# Patient Record
Sex: FEMALE | Race: WHITE | NOT HISPANIC OR LATINO | Employment: FULL TIME | URBAN - METROPOLITAN AREA
[De-identification: names, ages, dates, MRNs, and addresses within clinical notes are randomized per-mention and may not be internally consistent; named-entity substitution may affect disease eponyms.]

---

## 2017-08-14 ENCOUNTER — OFFICE VISIT (OUTPATIENT)
Dept: URGENT CARE | Facility: URGENT CARE | Age: 37
End: 2017-08-14
Payer: COMMERCIAL

## 2017-08-14 VITALS
WEIGHT: 194.5 LBS | TEMPERATURE: 98.7 F | SYSTOLIC BLOOD PRESSURE: 106 MMHG | HEART RATE: 87 BPM | DIASTOLIC BLOOD PRESSURE: 68 MMHG | OXYGEN SATURATION: 96 %

## 2017-08-14 DIAGNOSIS — S86.812A STRAIN OF CALF MUSCLE, LEFT, INITIAL ENCOUNTER: Primary | ICD-10-CM

## 2017-08-14 PROCEDURE — 99202 OFFICE O/P NEW SF 15 MIN: CPT | Performed by: PHYSICIAN ASSISTANT

## 2017-08-14 NOTE — NURSING NOTE
Chief Complaint   Patient presents with     Musculoskeletal Problem     start: on and off for 1 month, right calf sx: pain, family hx of blood clots, no injury, no warmth, or swelling of the feets  tx: none         Initial /68 (BP Location: Right arm, Patient Position: Chair, Cuff Size: Adult Large)  Pulse 87  Temp 98.7  F (37.1  C) (Oral)  Wt 194 lb 8 oz (88.2 kg)  SpO2 96% There is no height or weight on file to calculate BMI.  Medication Reconciliation: complete   [place] Ontiveros MA

## 2017-08-14 NOTE — PROGRESS NOTES
Elvira Ugalde is a 37 year old year old female who presents today for evaluation of complaints of left calf discomfort that has been occurring on and off for the past month. States it occurred again when she went running today. Concerned because her aunt had a blood clot and she went on the internet and started to worry. States she is otherwise healthy.     Review Of Systems  Skin: negative for rash, redness, swelling  Eyes: negative  Ears/Nose/Throat: negative  Respiratory: No shortness of breath, dyspnea on exertion, cough, or hemoptysis  Cardiovascular: negative, no hx of vascular issues  Musculoskeletal: positive for occasional discomfort in left calf, increases with exercise. Pain mild - not intense. Right calf negative.  Neurologic: negative. No numbness, loss of sensation, weakness, change in gait    History reviewed. No pertinent past medical history.    History reviewed. No pertinent surgical history.    Family History   Problem Relation Age of Onset     Family History Negative Mother      Family History Negative Father      Family History Negative Maternal Grandmother      Family History Negative Maternal Grandfather      Family History Negative Paternal Grandmother      Family History Negative Paternal Grandfather      Family History Negative Brother      Family History Negative Sister        Social History   Substance Use Topics     Smoking status: Current Every Day Smoker     Types: Cigarettes     Smokeless tobacco: Never Used     Alcohol use Yes       Drug and lactation database from the [place] National Library of Medicine:  http://toxnet.nlm.nih.gov/cgi-bin/sis/htmlgen?LACT      Exam:  Constitutional: healthy, alert and no distress  Head: negative  Cardiovascular: negative  Respiratory: negative  Musculoskeletal: extremities normal- no gross deformities noted, normal muscle tone, no ankle edema, no calf edema, no erythema, pedal and DP  pulses normal bilaterally, normal range of motion  bilaterally, nml capillary refill, no tenderness to deep palpation of bilateral calves, negative Rueda sign, Muscle strength equal bilateral LE's  Skin: no suspicious lesions or rashes  Neurologic: negative, nml gross sensation bilateral LE's    A/P:    (E68.037D) Strain of calf muscle, left, initial encounter  (primary encounter diagnosis)  Comment: Pt informed that likelihood of blood clot is low. However, if pt is still concerned she can have a D-dimer and US done by primary MD or at an ER - neither tests are done at this UC.  Plan:Informed pt of warning signs for a blood clot. Pt will follow up with primary MD if she continues to have concerns.      Follow up with primary MD prn problems/worsening symptoms.

## 2018-10-17 ENCOUNTER — OFFICE VISIT (OUTPATIENT)
Dept: URGENT CARE | Facility: URGENT CARE | Age: 38
End: 2018-10-17
Payer: COMMERCIAL

## 2018-10-17 VITALS
TEMPERATURE: 97.5 F | HEART RATE: 71 BPM | DIASTOLIC BLOOD PRESSURE: 72 MMHG | OXYGEN SATURATION: 97 % | SYSTOLIC BLOOD PRESSURE: 108 MMHG

## 2018-10-17 DIAGNOSIS — S01.80XA OPEN WOUND OF FACE, INITIAL ENCOUNTER: Primary | ICD-10-CM

## 2018-10-17 DIAGNOSIS — Z23 NEED FOR TDAP VACCINATION: ICD-10-CM

## 2018-10-17 PROCEDURE — 90471 IMMUNIZATION ADMIN: CPT | Performed by: FAMILY MEDICINE

## 2018-10-17 PROCEDURE — 99213 OFFICE O/P EST LOW 20 MIN: CPT | Mod: 25 | Performed by: FAMILY MEDICINE

## 2018-10-17 PROCEDURE — 90715 TDAP VACCINE 7 YRS/> IM: CPT | Performed by: FAMILY MEDICINE

## 2018-10-17 NOTE — MR AVS SNAPSHOT
After Visit Summary   10/17/2018    Elvira Ugalde    MRN: [id number]           Patient Information     Date Of Birth          1980        Visit Information        Provider Department      10/17/2018 9:00 AM Kelvin Rodas MD [facility]        Today's Diagnoses     Open wound of face, initial encounter    -  1    Need for Tdap vaccination          Care Instructions    Monitor for wound infection.  Follow up with Dermatology or Plastic Surgery for scar assessment.      Old Laceration: Not Sutured  A laceration is a cut through the skin. This will usually require stitches if it is deep. However, if a laceration remains open for too long, the risk of infection increases. In your case, too much time has passed before coming for treatment. The danger of infection from stitching at this time is too high. That is why your wound was not stitched.  If the wound is spread open, it will heal by filling in from the bottom and sides. A wound that is not stitched may take 1 to 4 weeks to heal, depending on the size of the opening. You will probably have a visible scar. You can discuss revision of the scar with your healthcare provider at a later time.  Home care  The following guidelines will help you care for your laceration at home:    Keep the wound clean and dry. If a bandage was applied and it becomes wet or dirty, replace it. Otherwise, leave it in place for the first 24 hours, then change it once a day or as directed.    The healthcare provider may prescribe an antibiotic cream or ointment to prevent infection.     If you are at high risk for infection, or the wound is very dirty, your provider may prescribe an oral antibiotic medicine to prevent infection. Don't stop using this medicine until you have finished it all, or the provider tells you to stop.    The healthcare provider may also prescribe medicine for pain. Follow instructions for taking these medicines.  Clean the wound  daily:    After removing any bandage, wash the area with soap and water. Use a wet cotton swab to loosen and remove any blood or crust that forms.    Talk with your healthcare provider before applying any antibiotic ointment to the wound. Reapply a fresh bandage.    You may remove the bandage to shower as usual after the first 24 hours, but don't soak the area in water. This means no tub baths or swimming until the wound heals or your provider tells you it's OK.  Don't do activities that may reinjure your wound.    Check the wound daily for signs of infection listed below.  Follow-up care  Follow up with your healthcare provider, or as advised.  When to seek medical advice  Call your healthcare provider right away if any of these occur:    Wound bleeding not controlled by direct pressure    Signs of infection, including increasing pain in the wound, increasing wound redness or swelling, or pus or bad odor coming from the wound    Fever of 100.4 F (38. C) or higher, or as directed by your healthcare provider    Wound edges reopen    Wound changes colors    Numbness around the wound     Decreased movement around the injured area  Date Last Reviewed: 7/1/2017 2000-2017 [employer]. [address] [place] [address]. All rights reserved. This information is not intended as a substitute for professional medical care. Always follow your healthcare professional's instructions.                Follow-ups after your visit        Who to contact     If you have questions or need follow up information about today's clinic visit or your schedule please contact [facility] directly at [phone].  Normal or non-critical lab and imaging results will be communicated to you by MyChart, letter or phone within 4 business days after the clinic has received the results. If you do not hear from us within 7 days, please contact the clinic through MyChart or phone. If you have a critical or abnormal  lab result, we will notify you by phone as soon as possible.  Submit refill requests through [employer] or call your pharmacy and they will forward the refill request to us. Please allow 3 business days for your refill to be completed.          Additional Information About Your Visit        Care EveryWhere ID     This is your Care EveryWhere ID. This could be used by other organizations to access your [place] medical records  [id number]        Your Vitals Were     Pulse Temperature Pulse Oximetry             71 97.5  F (36.4  C) (Tympanic) 97%          Blood Pressure from Last 3 Encounters:   10/17/18 108/72   08/14/17 106/68    Weight from Last 3 Encounters:   08/14/17 194 lb 8 oz (88.2 kg)              We Performed the Following     TDAP, IM (10 - 64 YRS) - Adacel        Primary Care Provider    Provider Not In System                Equal Access to Services     [facility] CenterMARLYN : Hadii nieves cuevaso Soniraj, waaxda luqadaha, qaybta kaalmada adeegyada, brisa bolden . So [facility] [phone].    ATENCIÓN: Si habla español, tiene a pal disposición servicios gratuitos de asistencia lingüística. Ramírez al [phone].    We comply with applicable federal civil rights laws and [place] laws. We do not discriminate on the basis of race, color, national origin, age, disability, sex, sexual orientation, or gender identity.            Thank you!     Thank you for choosing [facility]  for your care. Our goal is always to provide you with excellent care. Hearing back from our patients is one way we can continue to improve our services. Please take a few minutes to complete the written survey that you may receive in the mail after your visit with us. Thank you!             Your Updated Medication List - Protect others around you: Learn how to safely use, store and throw away your medicines at www.disposemymeds.org.      Notice  As of 10/17/2018  9:26 AM    You have not been prescribed any  medications.

## 2018-10-17 NOTE — PROGRESS NOTES
SUBJECTIVE:     Chief Complaint   Patient presents with     Urgent Care     Laceration     laceration on top on lip from door x last night      Elvira Ugalde is a 38 year old female who presents to the clinic with a laceration on upper lip between philtrum sustained last night around 9pm (12 hours ago).  This is a non-work related and accidental injury.  Patient applied adhesive glue to area last night.      Mechanism of injury: car door.    Associated symptoms: Denies numbness, weakness, or loss of function  Last tetanus booster within 10 years: no    EXAM:   The patient appears today in alert,no apparent distress distress  VITALS: /72 (BP Location: Left arm, Patient Position: Chair, Cuff Size: Adult Regular)  Pulse 71  Temp 97.5  F (36.4  C) (Tympanic)  SpO2 97%    Size of laceration: 1 centimeters  Characteristics of the laceration: straight and superficial  Tendon function intact: not applicable  Sensation to light touch intact: yes  Pulses intact: not applicable  Picture included in patient's chart: no    Assessment:     Open wound of face, initial encounter  Need for Tdap vaccination    PLAN:  Wound cleaned with Shur-Clens, too old for wound repair    After care instructions:  Keep wound clean and dry  Signs of infection discussed today  Discussed the probability of scarring  Follow up with Derm or Plastic Surgery for scar assessment after wound heals    Tdap given today.    Kelvin Rodas MD  October 17, 2018 9:39 AM

## 2018-10-17 NOTE — PATIENT INSTRUCTIONS
Monitor for wound infection.  Follow up with Dermatology or Plastic Surgery for scar assessment.      Old Laceration: Not Sutured  A laceration is a cut through the skin. This will usually require stitches if it is deep. However, if a laceration remains open for too long, the risk of infection increases. In your case, too much time has passed before coming for treatment. The danger of infection from stitching at this time is too high. That is why your wound was not stitched.  If the wound is spread open, it will heal by filling in from the bottom and sides. A wound that is not stitched may take 1 to 4 weeks to heal, depending on the size of the opening. You will probably have a visible scar. You can discuss revision of the scar with your healthcare provider at a later time.  Home care  The following guidelines will help you care for your laceration at home:    Keep the wound clean and dry. If a bandage was applied and it becomes wet or dirty, replace it. Otherwise, leave it in place for the first 24 hours, then change it once a day or as directed.    The healthcare provider may prescribe an antibiotic cream or ointment to prevent infection.     If you are at high risk for infection, or the wound is very dirty, your provider may prescribe an oral antibiotic medicine to prevent infection. Don't stop using this medicine until you have finished it all, or the provider tells you to stop.    The healthcare provider may also prescribe medicine for pain. Follow instructions for taking these medicines.  Clean the wound daily:    After removing any bandage, wash the area with soap and water. Use a wet cotton swab to loosen and remove any blood or crust that forms.    Talk with your healthcare provider before applying any antibiotic ointment to the wound. Reapply a fresh bandage.    You may remove the bandage to shower as usual after the first 24 hours, but don't soak the area in water. This means no tub baths or swimming  until the wound heals or your provider tells you it's OK.  Don't do activities that may reinjure your wound.    Check the wound daily for signs of infection listed below.  Follow-up care  Follow up with your healthcare provider, or as advised.  When to seek medical advice  Call your healthcare provider right away if any of these occur:    Wound bleeding not controlled by direct pressure    Signs of infection, including increasing pain in the wound, increasing wound redness or swelling, or pus or bad odor coming from the wound    Fever of 100.4 F (38. C) or higher, or as directed by your healthcare provider    Wound edges reopen    Wound changes colors    Numbness around the wound     Decreased movement around the injured area  Date Last Reviewed: 7/1/2017 2000-2017 [employer]. [address] [place] [address], [place] [address]. All rights reserved. This information is not intended as a substitute for professional medical care. Always follow your healthcare professional's instructions.

## 2020-11-03 ENCOUNTER — APPOINTMENT (OUTPATIENT)
Dept: GENERAL RADIOLOGY | Facility: CLINIC | Age: 40
End: 2020-11-03
Attending: PHYSICIAN ASSISTANT
Payer: COMMERCIAL

## 2020-11-03 ENCOUNTER — HOSPITAL ENCOUNTER (EMERGENCY)
Facility: CLINIC | Age: 40
Discharge: HOME | End: 2020-11-03
Attending: EMERGENCY MEDICINE | Admitting: EMERGENCY MEDICINE
Payer: COMMERCIAL

## 2020-11-03 VITALS
RESPIRATION RATE: 20 BRPM | HEART RATE: 66 BPM | BODY MASS INDEX: 32.74 KG/M2 | SYSTOLIC BLOOD PRESSURE: 114 MMHG | HEIGHT: 66 IN | WEIGHT: 203.71 LBS | OXYGEN SATURATION: 97 % | DIASTOLIC BLOOD PRESSURE: 81 MMHG | TEMPERATURE: 96.8 F

## 2020-11-03 DIAGNOSIS — R07.9 CHEST PAIN, UNSPECIFIED: ICD-10-CM

## 2020-11-03 LAB
ANION GAP SERPL CALCULATED.3IONS-SCNC: 7 MMOL/L (ref 3–14)
BASOPHILS # BLD AUTO: 0.1 10E9/L (ref 0–0.2)
BASOPHILS NFR BLD AUTO: 0.7 %
BUN SERPL-MCNC: 10 MG/DL (ref 7–30)
CALCIUM SERPL-MCNC: 8.7 MG/DL (ref 8.5–10.1)
CHLORIDE SERPL-SCNC: 109 MMOL/L (ref 94–109)
CO2 SERPL-SCNC: 23 MMOL/L (ref 20–32)
CREAT SERPL-MCNC: 0.74 MG/DL (ref 0.52–1.04)
DIFFERENTIAL METHOD BLD: NORMAL
EOSINOPHIL # BLD AUTO: 0.1 10E9/L (ref 0–0.7)
EOSINOPHIL NFR BLD AUTO: 0.7 %
ERYTHROCYTE [DISTWIDTH] IN BLOOD BY AUTOMATED COUNT: 12.8 % (ref 10–15)
GFR SERPL CREATININE-BSD FRML MDRD: >90 ML/MIN/{1.73_M2}
GLUCOSE SERPL-MCNC: 90 MG/DL (ref 70–99)
HCT VFR BLD AUTO: 44.1 % (ref 35–47)
HGB BLD-MCNC: 14.6 G/DL (ref 11.7–15.7)
IMM GRANULOCYTES # BLD: 0 10E9/L (ref 0–0.4)
IMM GRANULOCYTES NFR BLD: 0.3 %
INTERPRETATION ECG - MUSE: NORMAL
LYMPHOCYTES # BLD AUTO: 1.4 10E9/L (ref 0.8–5.3)
LYMPHOCYTES NFR BLD AUTO: 15.8 %
MCH RBC QN AUTO: 31.4 PG (ref 26.5–33)
MCHC RBC AUTO-ENTMCNC: 33.1 G/DL (ref 31.5–36.5)
MCV RBC AUTO: 95 FL (ref 78–100)
MONOCYTES # BLD AUTO: 0.6 10E9/L (ref 0–1.3)
MONOCYTES NFR BLD AUTO: 6.4 %
NEUTROPHILS # BLD AUTO: 6.8 10E9/L (ref 1.6–8.3)
NEUTROPHILS NFR BLD AUTO: 76.1 %
NRBC # BLD AUTO: 0 10*3/UL
NRBC BLD AUTO-RTO: 0 /100
PLATELET # BLD AUTO: 323 10E9/L (ref 150–450)
POTASSIUM SERPL-SCNC: 4 MMOL/L (ref 3.4–5.3)
RBC # BLD AUTO: 4.65 10E12/L (ref 3.8–5.2)
SODIUM SERPL-SCNC: 139 MMOL/L (ref 133–144)
TROPONIN I SERPL-MCNC: <0.015 UG/L (ref 0–0.04)
TROPONIN I SERPL-MCNC: <0.015 UG/L (ref 0–0.04)
WBC # BLD AUTO: 8.9 10E9/L (ref 4–11)

## 2020-11-03 PROCEDURE — 80048 BASIC METABOLIC PNL TOTAL CA: CPT | Performed by: PHYSICIAN ASSISTANT

## 2020-11-03 PROCEDURE — 85025 COMPLETE CBC W/AUTO DIFF WBC: CPT | Performed by: PHYSICIAN ASSISTANT

## 2020-11-03 PROCEDURE — 84484 ASSAY OF TROPONIN QUANT: CPT | Mod: 91 | Performed by: PHYSICIAN ASSISTANT

## 2020-11-03 PROCEDURE — 71046 X-RAY EXAM CHEST 2 VIEWS: CPT

## 2020-11-03 PROCEDURE — 99285 EMERGENCY DEPT VISIT HI MDM: CPT | Mod: 25

## 2020-11-03 PROCEDURE — 93005 ELECTROCARDIOGRAM TRACING: CPT

## 2020-11-03 ASSESSMENT — MIFFLIN-ST. JEOR
SCORE: 1593.94
SCORE: 1610.75

## 2020-11-03 ASSESSMENT — ENCOUNTER SYMPTOMS
COUGH: 0
FEVER: 0
SHORTNESS OF BREATH: 0
NAUSEA: 1
PALPITATIONS: 0
VOMITING: 0
CHILLS: 0
ABDOMINAL PAIN: 0

## 2020-11-03 NOTE — ED AVS SNAPSHOT
[facility]  [address]  [facility] [address]  Phone: [phone]  Fax: [phone]                                    Elvira Ugalde   MRN: [id number]    Department: [facility]   Date of Visit: 11/3/2020           After Visit Summary Signature Page    I have received my discharge instructions, and my questions have been answered. I have discussed any challenges I see with this plan with the nurse or doctor.    ..........................................................................................................................................  Patient/Patient Representative Signature      ..........................................................................................................................................  Patient Representative Print Name and Relationship to Patient    ..................................................               ................................................  Date                                   Time    ..........................................................................................................................................  Reviewed by Signature/Title    ...................................................              ..............................................  Date                                               Time          22EPIC Rev 08/18

## 2020-11-03 NOTE — DISCHARGE INSTRUCTIONS
A video appointment was scheduled for you with :   Nov 09, 2020  8:00 AM   (Arrive by 7:45 AM)   Video Visit with ELDA Benitez [facility] ([facility] [address] [place] [address]   [address]   [place] [address] [phone]       Please have your hospital discharge paperwork, ID and insurance information available to review if needed. You will need to have a [employer] account for the video visit.  You can go to : [email or web address] or call [phone], for help in setting up the video visit.       Discharge Instructions  Chest Pain    You have been seen today for chest pain or discomfort.  At this time, your provider has found no signs that your chest pain is due to a serious or life-threatening condition, (or you have declined more testing and/or admission to the hospital). However, sometimes there is a serious problem that does not show up right away. Your evaluation today may not be complete and you may need further testing and evaluation.     Generally, every Emergency Department visit should have a follow-up clinic visit with either a primary or a specialty clinic/provider. Please follow-up as instructed by your emergency provider today.  Return to the Emergency Department if:    Your chest pain changes, gets worse, starts to happen more often, or comes with less activity.    You are newly short of breath.    You get very weak or tired.    You pass out or faint.    You have any new symptoms, like fever, cough, numb legs, or you cough up blood.    You have anything else that worries you.    Until you follow-up with your regular provider, please do the following:    Take one aspirin daily unless you have an allergy or are told not to by your provider.    If a stress test appointment has been made, go to the appointment.    If you have questions, contact your regular provider.    Follow-up with your regular provider/clinic as directed; this is  very important.    If you were given a prescription for medicine here today, be sure to read all of the information (including the package insert) that comes with your prescription.  This will include important information about the medicine, its side effects, and any warnings that you need to know about.  The pharmacist who fills the prescription can provide more information and answer questions you may have about the medicine.  If you have questions or concerns that the pharmacist cannot address, please call or return to the Emergency Department.       Remember that you can always come back to the Emergency Department if you are not able to see your regular provider in the amount of time listed above, if you get any new symptoms, or if there is anything that worries you.

## 2020-11-03 NOTE — ED NOTES
Care Management Initial Consult    General Information  Assessment completed with: Patient, Elvira     Primary Care Provider verified and updated as needed:   yes, no PCP on record.  Readmission within the last 30 days: no previous admission in last 30 days         Advance Care Planning: Advance Care Planning Reviewed: no concerns identified          Communication Assessment  Patient's communication style: spoken language (English or Bilingual)             Cognitive  Cognitive/Neuro/Behavioral: WDL                      Living Environment:   People in home:     unknown  Current living Arrangements:      unknown  Able to return to prior arrangements:  yes       Family/Social Support:  Care provided by:   unknown  Provides care for:   unknown                Description of Support System:    unknown       Current Resources:   Skilled Home Care Services:  None  Community Resources: None  Equipment currently used at home: none  Supplies currently used at home: None    Employment/Financial:  Employment Status: employed full-time    Pt is a  with [employer]     Financial Concerns: No concerns identified           Lifestyle & Psychosocial Needs:        Socioeconomic History     Marital status: Legally      Spouse name: Not on file     Number of children: Not on file     Years of education: Not on file     Highest education level: Not on file     Tobacco Use     Smoking status: Current Every Day Smoker     Types: Cigarettes     Smokeless tobacco: Never Used   Substance and Sexual Activity     Alcohol use: Yes     Comment: a few drinks per week     Drug use: No     Sexual activity: Yes     Partners: Male     Birth control/protection: I.U.D.       Functional Status:  Prior to admission patient needed assistance: No assistance needed             Mental Health Status:  Mental Health Status: Other (see comment)  Mental Health Management: Individual Therapy.  Pt reports that she is in therapy,  and that it is working, but that she currently has a lot of stress in her life.    Chemical Dependency Status:  Chemical Dependency Status: No Current Concerns             Values/Beliefs:  Spiritual, Cultural Beliefs, Congregation Practices, Values that affect care: no               Care Management Discharge Note    Discharge Date: 11/03/20       Discharge Disposition: Home    Discharge Services: None    Discharge DME:  None    Discharge Transportation: family or friend will provide    Private pay costs discussed: Not applicable    PAS Confirmation Code:  NA  Patient/family educated on Medicare website which has current facility and service quality ratings: no    Education Provided on the Discharge Plan:  yes.,  A PCP appt was made with the [facility] for 11/9/2020, the appointment information was put on the discharge instructions on the AVS.  A care coordination referral was made to the same clinic.  Persons Notified of Discharge Plans: no  Patient/Family in Agreement with the Plan: yes    Handoff Referral Completed: Yes      Bere Campbell RN Care Coordinator,  BSN, PHN, CCM, PETROS  Inpatient Care Coordination - Emergency Department  [facility]   [phone]

## 2020-11-03 NOTE — ED PROVIDER NOTES
"  History   Chief Complaint:  Chest Pain    HPI  Elvira Ugalde is a 40 year old female with no reported past medical history who presents for evaluation of chest pain. She reports the sudden onset of left-sided, non-radiating chest pain and tightness around 0830 this morning while sitting at her desk at work. She notes pain across her jaw but does note that this is a chronic issues secondary to grinding her teeth. She also notes intermittent nausea however she associates this with increased stress and anxiety. Here she denies pain at rest but reports that she has chest discomfort at the \"peak of her breath\". She did take 3 baby aspirin at home piror to presenting. The patient denies shortness of breath, cough, hemoptysis, palpitations, fever, or chills. She also denies abdominal pain. Notes a family history of blood clots, but denies a personal history of clots or family cardiac history. She denies recent travel or prolonged car rides. The patient denied recent surgery and is not on estrogen therapy.     Allergies:  No Known Allergies    Medications:    The patient is currently on no regular medications.    Past Medical History:    The patient denies any significant past medical history.    Past Surgical History:    The patient does not have any pertinent past surgical history.     Family History:    No past pertinent family history.     Social History:  Marital Status:   Presents with  at bedside  Tobacco use: Current some day smoker  Alcohol use: Yes  Drug use: No    Review of Systems   Constitutional: Negative for chills and fever.   Respiratory: Negative for cough and shortness of breath.    Cardiovascular: Positive for chest pain. Negative for palpitations.   Gastrointestinal: Positive for nausea. Negative for abdominal pain and vomiting.   All other systems reviewed and are negative.    Physical Exam     Patient Vitals for the past 24 hrs:   BP Temp Temp src Pulse Resp SpO2 Height Weight " "  11/03/20 1310 -- -- -- 66 20 97 % -- --   11/03/20 1300 114/81 -- -- 67 12 98 % -- --   11/03/20 1250 111/79 -- -- 73 13 96 % -- --   11/03/20 1230 118/65 -- -- 67 10 98 % -- --   11/03/20 1120 -- -- -- 64 11 96 % -- --   11/03/20 1115 105/73 -- -- 64 10 96 % -- --   11/03/20 1100 126/87 -- -- 70 12 99 % -- --   11/03/20 1048 -- -- -- -- -- -- -- 92.4 kg (203 lb 11.3 oz)   11/03/20 0924 126/82 96.8  F (36  C) Oral 74 20 97 % 1.676 m (5' 6\") 90.7 kg (200 lb)       Physical Exam  Vitals signs and nursing note reviewed.   Eyes:      General: No scleral icterus.     Extraocular Movements: Extraocular movements intact.      Conjunctiva/sclera: Conjunctivae normal.   Cardiovascular:      Rate and Rhythm: Regular rhythm. Normal Rate.     Pulses: Normal pulses.      Heart sounds: Normal heart sounds.   Pulmonary:      Effort: Pulmonary effort is normal.      Breath sounds: Normal breath sounds.   Abdominal:      General: Abdomen is flat. Bowel sounds are normal.      Palpations: Abdomen is soft.      Tenderness: There is no abdominal tenderness.   Musculoskeletal: Normal range of motion.      Right lower leg: No edema.      Left lower leg: No edema.   Skin:     General: Skin is warm and dry.   Neurological:      Mental Status: Responds appropriately to questions.   Psychiatric:         Mood and Affect: Mood normal.         Behavior: Behavior normal.     Emergency Department Course     ECG:  Indication: Chest pain  Time: 0927  Vent. Rate 73 bpm. FL interval 138. QRS duration 76. QT/QTc 400/440. P-R-T axis 51 -3 15.   NSR, Normal ECG   Read time: 0933    Imaging:  Radiology findings were communicated with the patient who voiced understanding of the findings.    XR Chest 2 Views:  No acute findings. The lungs are clear and there are no   pleural effusions. Normal heart size, as per radiology.     Laboratory:  Laboratory findings were communicated with the patient who voiced understanding of the findings.    CBC: WBC: 8.9, " HGB: 14.6, PLT: 323    BMP: WNL (Creatinine: 0.74)    Troponin (Collected 1106): <0.015     Repeat Troponin (Collected 1230): <0.015      Emergency Department Course:  Past medical records, nursing notes, and vitals reviewed.    10:52: I performed an exam of the patient as documented above.     EKG obtained in the ED, see results above.   IV was inserted and blood was drawn for laboratory testing, results above.  The patient was sent for a XR while in the emergency department, results above.     1156: I rechecked the patient and discussed the results of her workup thus far. No new symptoms or exacerbation of chest tightness.     Findings and plan explained to the Patient. Patient discharged home with instructions regarding supportive care, medications, and reasons to return. The importance of close follow-up was reviewed.     I personally reviewed the laboratory and imaging results with the Patient and answered all related questions prior to discharge.     Impression & Plan     Medical Decision Making:  Elvira Ugalde is a 40 year old female with no reported past medical history who presents for evaluation of non-exertional, non-radiating left side chest pain beginning approximately 3 hours prior to presenting. Vitals were reviewed. Clinical history and physical exam not felt consistent with PE in the absence of hemoptysis, dyspnea, hypoxia, nor tachycardia. The patient does smoke however she is PERC negative. Aortic dissection unlikely based on history and clinical exam as peripheral pulses were felt, no cardiac murmur detected and patient remained hemodynamically stable. CXR was reviewed and shows no evidence of pneumothorax, infiltrate to suggest pneumonia, widened mediastinum to suggest aortic dissection, obvious rib fracture or free air under the diaphragm to suggest perforated viscous ulcer. Labs obtained with no electrolyte abnormalities. EKG shows NSR and troponins were negative. HEART score risk of MACE  of 0.9-1.7%. Patient was monitored on telemetry throughout her ED stay. On recheck the patient denied any exacerbation in her discomfort or new symptoms therefore I have low clinical suspicion that the patients symptoms are secondary to an acute cardiac or pulmonary process. The above results and clinical impression were discussed with the patient and her . Both verbalized understanding. I encouraged smoking cessation. The patient was advised to follow up with her primary care provider in 1-2 days for reassessment and return tot he ED if she has increased discomfort, SOB, or any other medical concerns. All questions and concerns were addressed prior to discharge.     Diagnosis:    ICD-10-CM    1. Chest pain, unspecified  R07.9 Troponin I (now)     CARE COORDINATION       Disposition:  Discharged to home.    Discharge Medications:  New Prescriptions    No medications on file       Scribe Disclosure:  Padmaja RIDER, am serving as a scribe at 10:52 AM on 11/3/2020 to document services personally performed by Usman Otero MD based on my observations and the provider's statements to me.      Rajani Kaur PA-C  11/03/20 [id number]

## 2020-11-09 ENCOUNTER — VIRTUAL VISIT (OUTPATIENT)
Dept: PEDIATRICS | Facility: CLINIC | Age: 40
End: 2020-11-09
Payer: COMMERCIAL

## 2020-11-09 DIAGNOSIS — F41.9 ANXIETY: Primary | ICD-10-CM

## 2020-11-09 DIAGNOSIS — R07.9 CHEST PAIN, UNSPECIFIED TYPE: ICD-10-CM

## 2020-11-09 PROCEDURE — 96127 BRIEF EMOTIONAL/BEHAV ASSMT: CPT | Performed by: NURSE PRACTITIONER

## 2020-11-09 PROCEDURE — 99214 OFFICE O/P EST MOD 30 MIN: CPT | Mod: 95 | Performed by: NURSE PRACTITIONER

## 2020-11-09 RX ORDER — ESCITALOPRAM OXALATE 10 MG/1
10 TABLET ORAL DAILY
Qty: 90 TABLET | Refills: 3 | Status: SHIPPED | OUTPATIENT
Start: 2020-11-09 | End: 2021-02-01

## 2020-11-09 ASSESSMENT — PATIENT HEALTH QUESTIONNAIRE - PHQ9
10. IF YOU CHECKED OFF ANY PROBLEMS, HOW DIFFICULT HAVE THESE PROBLEMS MADE IT FOR YOU TO DO YOUR WORK, TAKE CARE OF THINGS AT HOME, OR GET ALONG WITH OTHER PEOPLE: SOMEWHAT DIFFICULT
SUM OF ALL RESPONSES TO PHQ QUESTIONS 1-9: 10
SUM OF ALL RESPONSES TO PHQ QUESTIONS 1-9: 10

## 2020-11-09 NOTE — PROGRESS NOTES
"  Elvira Ugalde is a 40 year old female who is being evaluated via a billable video visit.      The patient has been notified of following:     \"This video visit will be conducted via a call between you and your physician/provider. We have found that certain health care needs can be provided without the need for an in-person physical exam.  This service lets us provide the care you need with a video conversation.  If a prescription is necessary we can send it directly to your pharmacy.  If lab work is needed we can place an order for that and you can then stop by our lab to have the test done at a later time.    Video visits are billed at different rates depending on your insurance coverage.  Please reach out to your insurance provider with any questions.    If during the course of the call the physician/provider feels a video visit is not appropriate, you will not be charged for this service.\"    Patient has given verbal consent for Video visit? Yes  How would you like to obtain your AVS? MyChart  If you are dropped from the video visit, the video invite should be resent to: Text to cell phone: [phone]  Will anyone else be joining your video visit? No    Subjective     Elvira Ugalde is a 40 year old female who presents today via video visit for the following health issues:    History of Present Illness       She eats 4 or more servings of fruits and vegetables daily.She consumes 0 sweetened beverage(s) daily.She exercises with enough effort to increase her heart rate 9 or less minutes per day.  She exercises with enough effort to increase her heart rate 3 or less days per week.   She is taking medications regularly.       ED/UC Followup:    Facility:  [facility]   Date of visit: 11/03/2020  Reason for visit: chest pain   Current Status: improved            Video Start Time: 8:03 AM    New Patient/Transfer of Care    Review of Systems   Constitutional, HEENT, cardiovascular, pulmonary, GI, , " "musculoskeletal, neuro, skin, endocrine and psych systems are negative, except as otherwise noted.      Objective           Vitals:  No vitals were obtained today due to virtual visit.    Physical Exam     GENERAL: Healthy, alert and no distress  EYES: Eyes grossly normal to inspection.  No discharge or erythema, or obvious scleral/conjunctival abnormalities.  RESP: No audible wheeze, cough, or visible cyanosis.  No visible retractions or increased work of breathing.    SKIN: Visible skin clear. No significant rash, abnormal pigmentation or lesions.  NEURO: Cranial nerves grossly intact.  Mentation and speech appropriate for age.  PSYCH: Mentation appears normal, affect normal/bright, judgement and insight intact, normal speech and appearance well-groomed.      No results found for any visits on 11/09/20.        Assessment & Plan     Anxiety  Getting . Daughter with severe mental illness. Twins not doing well in school. Daughter got covid. Feels the chest pain was a panic attack.   - escitalopram (LEXAPRO) 10 MG tablet; Take 1 tablet (10 mg) by mouth daily. Reviewed r/b/se  -Continue therapy  -Decrease caffeine  -Increase exerise  -Send message with update in 6 weeks    Chest pain, unspecified type  See ED visit. Very low risk for cardiopulmonary etiology. Pt thinks this was a panic attack. States chest pain is 95% gone. No shortness of breath, jaw pain, diaphoresis, etc  -Discussed reasons to seek care urgently.        Tobacco Cessation:   reports that she has been smoking cigarettes. She has never used smokeless tobacco.  Tobacco Cessation Action Plan: Information offered: Patient not interested at this time      BMI:   Estimated body mass index is 32.88 kg/m  as calculated from the following:    Height as of 11/3/20: 1.676 m (5' 6\").    Weight as of 11/3/20: 92.4 kg (203 lb 11.3 oz).   Weight management plan: Discussed healthy diet and exercise guidelines      Rosie Sen, ELDA [facility] " [facility]      Video-Visit Details    Type of service:  Video Visit    Video End Time:8:17 AM    Originating Location (pt. Location): Home    Distant Location (provider location):  [facility]     Platform used for Video Visit: [employer]                Answers for HPI/ROS submitted by the patient on 11/9/2020   Chronic problems general questions HPI Form  If you checked off any problems, how difficult have these problems made it for you to do your work, take care of things at home, or get along with other people?: Somewhat difficult  PHQ9 TOTAL SCORE: 10

## 2020-11-10 ASSESSMENT — PATIENT HEALTH QUESTIONNAIRE - PHQ9: SUM OF ALL RESPONSES TO PHQ QUESTIONS 1-9: 10

## 2020-12-27 ENCOUNTER — HEALTH MAINTENANCE LETTER (OUTPATIENT)
Age: 40
End: 2020-12-27

## 2021-02-01 ENCOUNTER — OFFICE VISIT (OUTPATIENT)
Dept: PEDIATRICS | Facility: CLINIC | Age: 41
End: 2021-02-01
Payer: COMMERCIAL

## 2021-02-01 VITALS
HEART RATE: 75 BPM | OXYGEN SATURATION: 97 % | TEMPERATURE: 97.9 F | RESPIRATION RATE: 13 BRPM | BODY MASS INDEX: 30.36 KG/M2 | HEIGHT: 66 IN | DIASTOLIC BLOOD PRESSURE: 64 MMHG | WEIGHT: 188.9 LBS | SYSTOLIC BLOOD PRESSURE: 118 MMHG

## 2021-02-01 DIAGNOSIS — Z12.31 ENCOUNTER FOR SCREENING MAMMOGRAM FOR BREAST CANCER: ICD-10-CM

## 2021-02-01 DIAGNOSIS — F41.9 ANXIETY: ICD-10-CM

## 2021-02-01 DIAGNOSIS — F17.210 CIGARETTE NICOTINE DEPENDENCE WITHOUT COMPLICATION: ICD-10-CM

## 2021-02-01 DIAGNOSIS — Z00.00 ROUTINE GENERAL MEDICAL EXAMINATION AT A HEALTH CARE FACILITY: Primary | ICD-10-CM

## 2021-02-01 DIAGNOSIS — Z30.430 ENCOUNTER FOR IUD INSERTION: ICD-10-CM

## 2021-02-01 DIAGNOSIS — Z12.4 ENCOUNTER FOR SCREENING FOR CERVICAL CANCER: ICD-10-CM

## 2021-02-01 DIAGNOSIS — Z11.3 ROUTINE SCREENING FOR STI (SEXUALLY TRANSMITTED INFECTION): ICD-10-CM

## 2021-02-01 LAB
CHOLEST SERPL-MCNC: 189 MG/DL
HCV AB SERPL QL IA: NONREACTIVE
HDLC SERPL-MCNC: 70 MG/DL
HIV 1+2 AB+HIV1 P24 AG SERPL QL IA: NONREACTIVE
LDLC SERPL CALC-MCNC: 103 MG/DL
NONHDLC SERPL-MCNC: 119 MG/DL
T PALLIDUM AB SER QL: NONREACTIVE
TRIGL SERPL-MCNC: 79 MG/DL

## 2021-02-01 PROCEDURE — 96127 BRIEF EMOTIONAL/BEHAV ASSMT: CPT | Performed by: NURSE PRACTITIONER

## 2021-02-01 PROCEDURE — 90686 IIV4 VACC NO PRSV 0.5 ML IM: CPT | Performed by: NURSE PRACTITIONER

## 2021-02-01 PROCEDURE — 99000 SPECIMEN HANDLING OFFICE-LAB: CPT | Performed by: NURSE PRACTITIONER

## 2021-02-01 PROCEDURE — 90732 PPSV23 VACC 2 YRS+ SUBQ/IM: CPT | Performed by: NURSE PRACTITIONER

## 2021-02-01 PROCEDURE — 87491 CHLMYD TRACH DNA AMP PROBE: CPT | Performed by: NURSE PRACTITIONER

## 2021-02-01 PROCEDURE — 90472 IMMUNIZATION ADMIN EACH ADD: CPT | Performed by: NURSE PRACTITIONER

## 2021-02-01 PROCEDURE — 87389 HIV-1 AG W/HIV-1&-2 AB AG IA: CPT | Performed by: NURSE PRACTITIONER

## 2021-02-01 PROCEDURE — 86780 TREPONEMA PALLIDUM: CPT | Mod: 90 | Performed by: NURSE PRACTITIONER

## 2021-02-01 PROCEDURE — 80061 LIPID PANEL: CPT | Performed by: NURSE PRACTITIONER

## 2021-02-01 PROCEDURE — 99214 OFFICE O/P EST MOD 30 MIN: CPT | Mod: 25 | Performed by: NURSE PRACTITIONER

## 2021-02-01 PROCEDURE — 90471 IMMUNIZATION ADMIN: CPT | Performed by: NURSE PRACTITIONER

## 2021-02-01 PROCEDURE — 86803 HEPATITIS C AB TEST: CPT | Performed by: NURSE PRACTITIONER

## 2021-02-01 PROCEDURE — 99396 PREV VISIT EST AGE 40-64: CPT | Mod: 25 | Performed by: NURSE PRACTITIONER

## 2021-02-01 PROCEDURE — 36415 COLL VENOUS BLD VENIPUNCTURE: CPT | Performed by: NURSE PRACTITIONER

## 2021-02-01 PROCEDURE — 87591 N.GONORRHOEAE DNA AMP PROB: CPT | Performed by: NURSE PRACTITIONER

## 2021-02-01 PROCEDURE — G0145 SCR C/V CYTO,THINLAYER,RESCR: HCPCS | Performed by: NURSE PRACTITIONER

## 2021-02-01 PROCEDURE — 87624 HPV HI-RISK TYP POOLED RSLT: CPT | Performed by: NURSE PRACTITIONER

## 2021-02-01 RX ORDER — ESCITALOPRAM OXALATE 20 MG/1
20 TABLET ORAL DAILY
Qty: 90 TABLET | Refills: 1 | Status: SHIPPED | OUTPATIENT
Start: 2021-02-01

## 2021-02-01 SDOH — HEALTH STABILITY: MENTAL HEALTH: HOW OFTEN DO YOU HAVE SIX OR MORE DRINKS ON ONE OCCASION?: NOT ASKED

## 2021-02-01 SDOH — HEALTH STABILITY: MENTAL HEALTH: HOW OFTEN DO YOU HAVE A DRINK CONTAINING ALCOHOL?: NOT ASKED

## 2021-02-01 SDOH — HEALTH STABILITY: MENTAL HEALTH: HOW MANY STANDARD DRINKS CONTAINING ALCOHOL DO YOU HAVE ON A TYPICAL DAY?: NOT ASKED

## 2021-02-01 SDOH — HEALTH STABILITY: MENTAL HEALTH: HOW MANY DRINKS CONTAINING ALCOHOL DO YOU HAVE ON A TYPICAL DAY WHEN YOU ARE DRINKING?: NOT ASKED

## 2021-02-01 SDOH — HEALTH STABILITY: MENTAL HEALTH: HOW OFTEN DO YOU HAVE 6 OR MORE DRINKS ON ONE OCCASION?: NOT ASKED

## 2021-02-01 ASSESSMENT — ENCOUNTER SYMPTOMS
DIARRHEA: 0
CHILLS: 0
FREQUENCY: 0
HEARTBURN: 0
HEMATURIA: 0
COUGH: 0
NAUSEA: 1
EYE PAIN: 0
MYALGIAS: 0
NERVOUS/ANXIOUS: 1
SHORTNESS OF BREATH: 0
DIZZINESS: 0
FEVER: 0
CONSTIPATION: 0
PARESTHESIAS: 0
PALPITATIONS: 0
HEADACHES: 1
SORE THROAT: 0
HEMATOCHEZIA: 0
ARTHRALGIAS: 0
WEAKNESS: 0
BREAST MASS: 1
JOINT SWELLING: 0
ABDOMINAL PAIN: 0
DYSURIA: 0

## 2021-02-01 ASSESSMENT — PATIENT HEALTH QUESTIONNAIRE - PHQ9
10. IF YOU CHECKED OFF ANY PROBLEMS, HOW DIFFICULT HAVE THESE PROBLEMS MADE IT FOR YOU TO DO YOUR WORK, TAKE CARE OF THINGS AT HOME, OR GET ALONG WITH OTHER PEOPLE: SOMEWHAT DIFFICULT
SUM OF ALL RESPONSES TO PHQ QUESTIONS 1-9: 8
SUM OF ALL RESPONSES TO PHQ QUESTIONS 1-9: 8

## 2021-02-01 ASSESSMENT — ANXIETY QUESTIONNAIRES
2. NOT BEING ABLE TO STOP OR CONTROL WORRYING: SEVERAL DAYS
GAD7 TOTAL SCORE: 4
5. BEING SO RESTLESS THAT IT IS HARD TO SIT STILL: NOT AT ALL
7. FEELING AFRAID AS IF SOMETHING AWFUL MIGHT HAPPEN: NOT AT ALL
4. TROUBLE RELAXING: SEVERAL DAYS
GAD7 TOTAL SCORE: 4
1. FEELING NERVOUS, ANXIOUS, OR ON EDGE: SEVERAL DAYS
7. FEELING AFRAID AS IF SOMETHING AWFUL MIGHT HAPPEN: NOT AT ALL
3. WORRYING TOO MUCH ABOUT DIFFERENT THINGS: SEVERAL DAYS
6. BECOMING EASILY ANNOYED OR IRRITABLE: NOT AT ALL
GAD7 TOTAL SCORE: 4

## 2021-02-01 ASSESSMENT — MIFFLIN-ST. JEOR: SCORE: 1538.59

## 2021-02-01 NOTE — PROGRESS NOTES
SUBJECTIVE:   CC: Elvira Ugalde is an 41 year old woman who presents for preventive health visit.     Patient has been advised of split billing requirements and indicates understanding: Yes     Healthy Habits:     Getting at least 3 servings of Calcium per day:  Yes    Bi-annual eye exam:  Yes    Dental care twice a year:  Yes    Sleep apnea or symptoms of sleep apnea:  Excessive snoring    Diet:  Regular (no restrictions)    Frequency of exercise:  6-7 days/week    Duration of exercise:  30-45 minutes    Taking medications regularly:  Yes    Medication side effects:  Other    PHQ-2 Total Score: 3    Additional concerns today:  Yes    Mirena IUD placed 7 years ago, needs to get this removed and would like another one placed. Has some intermittent spotting on the IUD, doesn't get regular periods with it.     Started on lexapro 10 mg daily in November 2020. Going through a really stressful time right now.  of 20 years had an affair in October 2020, going through the divorce right now. Has 4 children, one of which has pretty severe mental illness.     Would like STI screening. No known exposures but  has been unfaithful and was not compliant with condoms.     Currently smoking about 3 cigarettes daily, has smoked x 13 years. Realizes she should quit but isn't quite ready at this time.     Today's PHQ-2 Score:   PHQ-2 ( 1999 [employer]) 2/1/2021   Q1: Little interest or pleasure in doing things 1   Q2: Feeling down, depressed or hopeless 2   PHQ-2 Score 3   Q1: Little interest or pleasure in doing things Several days   Q2: Feeling down, depressed or hopeless More than half the days   PHQ-2 Score 3   Denies active SI.     PHQ 11/9/2020 2/1/2021   PHQ-9 Total Score 10 8   Q9: Thoughts of better off dead/self-harm past 2 weeks Not at all Several days   F/U: Thoughts of suicide or self-harm - Yes   F/U: Self harm-plan - No   F/U: Self-harm action - No   F/U: Safety concerns - No     PRATIBHA-7 SCORE 2/1/2021    Total Score 4 (minimal anxiety)   Total Score 4     Abuse: Current or Past (Physical, Sexual or Emotional) - No  Do you feel safe in your environment? Yes    Social History     Tobacco Use     Smoking status: Current Every Day Smoker     Types: Cigarettes     Smokeless tobacco: Never Used   Substance Use Topics     Alcohol use: Yes     Comment: a few drinks per week     Alcohol Use 2/1/2021   Prescreen: >3 drinks/day or >7 drinks/week? No       Any new diagnosis of family breast, ovarian, or bowel cancer? No     Reviewed orders with patient.  Reviewed health maintenance and updated orders accordingly - Yes  BP Readings from Last 3 Encounters:   02/01/21 118/64   11/03/20 114/81   10/17/18 108/72    Wt Readings from Last 3 Encounters:   02/01/21 85.7 kg (188 lb 14.4 oz)   11/03/20 92.4 kg (203 lb 11.3 oz)   08/14/17 88.2 kg (194 lb 8 oz)           Breast CA Risk Screening:  No flowsheet data found.  Mammogram Screening - Offered annual screening and updated Health Maintenance for mutual plan based on risk factor consideration. Pertinent mammograms are reviewed under the imaging tab.    History of abnormal Pap smear: NO - age 30-65 PAP every 5 years with negative HPV co-testing recommended     Reviewed and updated as needed this visit by clinical staff  Tobacco  Allergies  Meds   Med Hx  Surg Hx  Fam Hx  Soc Hx      Reviewed and updated as needed this visit by Provider                Past Medical History:   Diagnosis Date     PRATIBHA (generalized anxiety disorder)      Severe episode of recurrent major depressive disorder, without psychotic features (H)      Past Surgical History:   Procedure Laterality Date     NO HISTORY OF SURGERY       Family History   Problem Relation Age of Onset     Family History Negative Mother      Prostate Cancer Father 70        In remission     Family History Negative Maternal Grandmother      Family History Negative Maternal Grandfather      Family History Negative Paternal  Grandmother      Family History Negative Paternal Grandfather      Family History Negative Brother      Family History Negative Sister      Social History     Socioeconomic History     Marital status: Legally      Spouse name: Not on file     Number of children: Not on file     Years of education: Not on file     Highest education level: Not on file   Occupational History     Not on file   Social Needs     Financial resource strain: Not on file     Food insecurity     Worry: Not on file     Inability: Not on file     Transportation needs     Medical: Not on file     Non-medical: Not on file   Tobacco Use     Smoking status: Current Every Day Smoker     Packs/day: 0.25     Years: 13.00     Pack years: 3.25     Types: Cigarettes     Smokeless tobacco: Never Used   Substance and Sexual Activity     Alcohol use: Yes     Comment: 3 drinks/week     Drug use: No     Sexual activity: Yes     Partners: Male     Birth control/protection: I.U.D.     Comment: Mirena placed 7 years ago   Lifestyle     Physical activity     Days per week: Not on file     Minutes per session: Not on file     Stress: Not on file   Relationships     Social connections     Talks on phone: Not on file     Gets together: Not on file     Attends Tenriism service: Not on file     Active member of club or organization: Not on file     Attends meetings of clubs or organizations: Not on file     Relationship status: Not on file     Intimate partner violence     Fear of current or ex partner: Not on file     Emotionally abused: Not on file     Physically abused: Not on file     Forced sexual activity: Not on file   Other Topics Concern     Parent/sibling w/ CABG, MI or angioplasty before 65F 55M? Not Asked   Social History Narrative     Not on file     Current Outpatient Medications   Medication     escitalopram (LEXAPRO) 20 MG tablet     No current facility-administered medications for this visit.       No Known Allergies    Review of Systems  "  Constitutional: Negative for chills and fever.   HENT: Negative for congestion, ear pain, hearing loss and sore throat.    Eyes: Negative for pain and visual disturbance.   Respiratory: Negative for cough and shortness of breath.    Cardiovascular: Negative for chest pain, palpitations and peripheral edema.   Gastrointestinal: Positive for nausea. Negative for abdominal pain, constipation, diarrhea, heartburn and hematochezia.   Breasts:  Positive for breast mass. Negative for tenderness and discharge.   Genitourinary: Positive for vaginal bleeding. Negative for dysuria, frequency, genital sores, hematuria, pelvic pain, urgency and vaginal discharge.   Musculoskeletal: Negative for arthralgias, joint swelling and myalgias.   Skin: Negative for rash.   Neurological: Positive for headaches. Negative for dizziness, weakness and paresthesias.   Psychiatric/Behavioral: Negative for mood changes. The patient is nervous/anxious.         OBJECTIVE:   /64 (BP Location: Right arm, Patient Position: Chair, Cuff Size: Adult Large)   Pulse 75   Temp 97.9  F (36.6  C) (Tympanic)   Resp 13   Ht 1.676 m (5' 6\")   Wt 85.7 kg (188 lb 14.4 oz)   SpO2 97%   BMI 30.49 kg/m    Physical Exam  Constitutional: appears to be in no acute distress, comfortable, pleasant.   Eyes: anicteric, conjunctiva clear without drainage or erythema. SHAMEKA.   Ears, Nose and Throat: tympanic membranes gray with LR,  nose without nasal discharge. OP: no erythema to posterior pharynx, negative post nasal drainage, tonsils +1 no erythema or exudate.  Neck: supple, thyroid palpable,not enlarged, no nodules   Breast: No nipple abnormality or discharge, no dominant masses, tenderness to palpation, axillary, supraclavicular, or infraclavicular adenopathy.  Cardiovascular: regular rate and rhythm, normal S1 and S2, no murmurs, rubs or gallops, peripheral pulses full and symmetric; negative peripheral edema   Respiratory: Air entry throughout. Breathing " pattern unlabored without the use of accessory muscles. Clear to auscultation A and P, no wheezes or crackles, normal breath sounds.    Gastrointestinal: rounded, soft. Positive bowel sounds x4, nontender, no masses.   Genitourinary: external genitalia is without lesions. Introitus is normal, vaginal walls pink and moist without lesions or evidence of trauma. There is no abnormal discharge from the cervix. Cervix is pink without polyps or lesions.  Musculoskeletal: full range of motion, no edema.   Skin: pink, turgor smooth and elastic. Negative for lesions or dryness.  Neurological: normal gait, no tremor.   Psychological: appropriate mood and affect.   Lymphatic: no cervical, axillary, supraclavicular, or infraclavicular lymphadenopathy.      Diagnostic Test Results:  Labs reviewed in Epic    ASSESSMENT/PLAN:   1. Routine general medical examination at a health care facility  Age appropriate screening and preventative care have been addressed today. Vaccinations have been updated. Patient has been advised to undertake routine aerobic activity. Recommend annual vision exams as well as biannual dental exams. They will follow up for annual physical again in one year.   Plan:   - REVIEW OF HEALTH MAINTENANCE PROTOCOL ORDERS   - Lipid panel reflex to direct LDL Non-fasting   - INFLUENZA VACCINE IM > 6 MONTHS VALENT IIV4 [12134]   - INITIAL VACCINE ADMINSTRATION    2. Encounter for screening for cervical cancer   Denies history of abnormal paps. Due for routine screening today.   Plan:   - Pap imaged thin layer screen with HPV - recommended age 30 - 65 years (select HPV order below)   - HPV High Risk Types DNA Cervical    3. Encounter for screening mammogram for breast cancer  No prior mammogram. Clinical breast exam completed today and was unremarkable. Discussed starting mammogram screening, patient would like to move forward with this.   Plan:   - MA Screen Bilateral w/Basim; Future    4. Routine screening for STI  (sexually transmitted infection)  Pt requesting routine STI screening today. No known exposures but  has been unfaithful and was not compliant with condoms.   Plan:   - **Hepatitis C Screen Reflex to RNA FUTURE anytime   - NEISSERIA GONORRHOEA PCR   - CHLAMYDIA TRACHOMATIS PCR   - Treponema Abs w Reflex to RPR and Titer   - HIV Antigen Antibody Combo    5. Cigarette nicotine dependence without complication  Currently smoking about 3 cigarettes daily, has smoked x 13 years. Realizes she should quit but isn't quite ready at this time. She is agreeable to getting pneumococcal vaccine today.   Plan:   - PPSV23, IM/SUBQ (2+ YRS) - [id number]   - EACH ADD'L VACCINE ADMINISTRATION    6. Anxiety  Ongoing, unchanged. Started on lexapro 10 mg daily in November 2020. Hasn't noted a significant difference in her anxiety/depression since that time. Going through a really stressful time right now.  of 20 years had an affair in October 2020, going through the divorce right now. Has 4 children, one of which has pretty severe mental illness. Plan to increase lexapro to 20 mg daily. She will provide update with how things are going on this dose via [employer] in 4-6 weeks. Encouraged continuing regular exercise, leaning on her support systems. Crisis numbers outlined in AVS. Denies active SI.   Plan:   - escitalopram (LEXAPRO) 20 MG tablet; Take 1 tablet (20 mg) by mouth daily  Dispense: 90 tablet; Refill: 1    7. Encounter for IUD insertion  Mirena IUD placed 7 years ago, needs to get this removed and would like another one placed.   Plan:   - OB/GYN REFERRAL      Follow-up: Lab results pending, will follow-up as indicated after reviewing results.     COUNSELING:  Reviewed preventive health counseling, as reflected in patient instructions  Special attention given to:        Regular exercise       Vision screening       Immunizations    Vaccinated for: Influenza and Pneumococcal         Alcohol Use       Contraception      "  Osteoporosis prevention/bone health       Safe sex practices/STD prevention       Consider Hep C screening for all patients one time for ages 18-79 years       HIV screeninx in teen years, 1x in adult years, and at intervals if high risk       One time pneumovax for smokers    Estimated body mass index is 30.49 kg/m  as calculated from the following:    Height as of this encounter: 1.676 m (5' 6\").    Weight as of this encounter: 85.7 kg (188 lb 14.4 oz).    Weight management plan: Discussed healthy diet and exercise guidelines    She reports that she has been smoking cigarettes. She has a 3.25 pack-year smoking history. She has never used smokeless tobacco.     Tobacco Cessation Action Plan:   Information offered: Patient not interested at this time      Counseling Resources:  ATP IV Guidelines  Pooled Cohorts Equation Calculator  Breast Cancer Risk Calculator  BRCA-Related Cancer Risk Assessment: FHS-7 Tool  FRAX Risk Assessment  ICSI Preventive Guidelines  Dietary Guidelines for Americans,   USDA's MyPlate  ASA Prophylaxis  Lung CA Screening    ELDA Chavis [facility]  "

## 2021-02-02 LAB
C TRACH DNA SPEC QL NAA+PROBE: NEGATIVE
N GONORRHOEA DNA SPEC QL NAA+PROBE: NEGATIVE
SPECIMEN SOURCE: NORMAL
SPECIMEN SOURCE: NORMAL

## 2021-02-02 ASSESSMENT — PATIENT HEALTH QUESTIONNAIRE - PHQ9: SUM OF ALL RESPONSES TO PHQ QUESTIONS 1-9: 8

## 2021-02-02 ASSESSMENT — ANXIETY QUESTIONNAIRES: GAD7 TOTAL SCORE: 4

## 2021-02-04 LAB
COPATH REPORT: NORMAL
PAP: NORMAL

## 2021-02-08 LAB
FINAL DIAGNOSIS: NORMAL
HPV HR 12 DNA CVX QL NAA+PROBE: NEGATIVE
HPV16 DNA SPEC QL NAA+PROBE: NEGATIVE
HPV18 DNA SPEC QL NAA+PROBE: NEGATIVE
SPECIMEN DESCRIPTION: NORMAL
SPECIMEN SOURCE CVX/VAG CYTO: NORMAL

## 2021-02-22 ENCOUNTER — ANCILLARY PROCEDURE (OUTPATIENT)
Dept: MAMMOGRAPHY | Facility: CLINIC | Age: 41
End: 2021-02-22
Attending: NURSE PRACTITIONER
Payer: COMMERCIAL

## 2021-02-22 ENCOUNTER — OFFICE VISIT (OUTPATIENT)
Dept: OBGYN | Facility: CLINIC | Age: 41
End: 2021-02-22
Payer: COMMERCIAL

## 2021-02-22 VITALS — BODY MASS INDEX: 29.41 KG/M2 | SYSTOLIC BLOOD PRESSURE: 116 MMHG | WEIGHT: 182.2 LBS | DIASTOLIC BLOOD PRESSURE: 68 MMHG

## 2021-02-22 DIAGNOSIS — Z30.433 ENCOUNTER FOR REMOVAL AND REINSERTION OF INTRAUTERINE CONTRACEPTIVE DEVICE: ICD-10-CM

## 2021-02-22 DIAGNOSIS — Z12.31 ENCOUNTER FOR SCREENING MAMMOGRAM FOR BREAST CANCER: ICD-10-CM

## 2021-02-22 DIAGNOSIS — Z01.812 PRE-PROCEDURE LAB EXAM: Primary | ICD-10-CM

## 2021-02-22 LAB — HCG UR QL: NEGATIVE

## 2021-02-22 PROCEDURE — 58300 INSERT INTRAUTERINE DEVICE: CPT | Performed by: OBSTETRICS & GYNECOLOGY

## 2021-02-22 PROCEDURE — 58301 REMOVE INTRAUTERINE DEVICE: CPT | Performed by: OBSTETRICS & GYNECOLOGY

## 2021-02-22 PROCEDURE — 77067 SCR MAMMO BI INCL CAD: CPT | Mod: TC | Performed by: RADIOLOGY

## 2021-02-22 PROCEDURE — 81025 URINE PREGNANCY TEST: CPT | Performed by: OBSTETRICS & GYNECOLOGY

## 2021-02-22 PROCEDURE — 77063 BREAST TOMOSYNTHESIS BI: CPT | Mod: TC | Performed by: RADIOLOGY

## 2021-02-22 NOTE — RESULT ENCOUNTER NOTE
Results discussed directly with patient in clinic. Further details documented in the note.     Magdalena Steven MD

## 2021-02-22 NOTE — NURSING NOTE
"Chief Complaint   Patient presents with     IUD     patient is here for Mirena IUD re-insertion. Patient had IUD placed 7 years ago. No questions or concerns.        Initial /68   Wt 82.6 kg (182 lb 3.2 oz)   LMP 2021   BMI 29.41 kg/m   Estimated body mass index is 29.41 kg/m  as calculated from the following:    Height as of 21: 1.676 m (5' 6\").    Weight as of this encounter: 82.6 kg (182 lb 3.2 oz).  BP completed using cuff size: regular    Questioned patient about current smoking habits.  Pt. currently smokes.  Advised about smoking cessation.          The following HM Due: NONE      Olivia Gallagher CMA               "

## 2021-02-24 NOTE — PROGRESS NOTES
INDICATIONS:                                                      Elvira Ugalde is a 41 year old female,, Patient's last menstrual period was 2021. who presents today for mirena  IUD removal and reinsertion. Her current IUD was placed 7 ago. She has not had problems with the IUD. She requests removal of the IUD because the IUD effectiveness has  and would like reinsertion today. The risks, benefits and alternatives of IUD insertion were discussed in detail previously.  She has elected to go ahead with the insertion  today and her questions were answered. Consent was signed. A pregnancy test was not performed today due to continued use of effective contraception.    Past Medical History:   Diagnosis Date     PRATIBHA (generalized anxiety disorder)      Severe episode of recurrent major depressive disorder, without psychotic features (H)       Past Surgical History:   Procedure Laterality Date     NO HISTORY OF SURGERY          PROCEDURE:                                                    /68   Wt 82.6 kg (182 lb 3.2 oz)   LMP 2021   BMI 29.41 kg/m     The pelvic exam revealed normal external genitalia. On bimanual exam the uterus was Anteverted and normal in size with no tenderness present. A speculum was inserted into the vagina and the cervix was visualized. The strings were clearly visible at the external cervical os and they were grasped with a ring forceps and the IUD was removed intact. The cervix was then prepped with Betadine. The anterior lip of the cervix was grasped with a single toothed tenaculum. The uterus sounded to 8 cm. A Mirena IUD was then inserted without difficulty. The string was cut to 2 cm.  The patient experienced a mild amount of cramping.    POST PROCEDURE:                                                    Elvira Ugalde is a 41 year old female here for removal and insertion of IUD.  She  tolerated the procedure well. There were no complications. Patient  was discharged in stable condition.    Return to clinic in 4-5 weeks for IUD check.  No need for back up contraception given continuous IUD use. Call if severe cramping, fever, abnormal bleeding, abnormal discharge or pelvic pain develop.  Patient was counseled about the chance of irregular bleeding.    Magdalena Steven MD

## 2021-03-08 ENCOUNTER — TELEPHONE (OUTPATIENT)
Dept: PEDIATRICS | Facility: CLINIC | Age: 41
End: 2021-03-08

## 2021-03-08 NOTE — TELEPHONE ENCOUNTER
Called patient and schedule for a video visit on 4/8. Patient also stated that her Lexapro increase seems to be doing well.     Patient had no other questions or concerns at this time.     Laisha Ellis, EMT at 4:37 PM on March 8, 2021  [facility] Guide   [phone]

## 2021-04-06 NOTE — PROGRESS NOTES
Elvira is a 41 year old who is being evaluated via a billable video visit.      How would you like to obtain your AVS? MyChart  If the video visit is dropped, the invitation should be resent by: Send to e-mail at: [email or web address]  Will anyone else be joining your video visit? No      Video Start Time: 8:00 am    Assessment & Plan     Anxiety  Stable. Increased lexapro 2 months ago. Felt improvement in anxiety initially but admits to medication non-compliance related to forgetfulness. Would like to restart the medication with goal to increase adherence. Continuing with therapy once weekly. Discussed goal to limit social media, consider 2 week break from all social media. Watch caffeine intake, continue regular exercise, and healthy sleep hygiene practices. Follow-up as needed per patient preference.       29 minutes spent on the date of the encounter doing chart review, patient visit and documentation        MEDICATIONS:        - Resume lexapro       - Continue other medications without change    Return if symptoms worsen or fail to improve.    ELDA Chavis [facility]    Subjective      Elvira is a 41 year old who presents for the following health issues:        Anxiety Follow-Up  Lexapro increased 2 months ago. Felt more leveled after the increase, less ups and downs. Admits to medication non-compliance, forgets to take. Took one day last week, felt more anxious so hasn't taken since that time. Has a hard time taking a medication everyday. Has been feeling okay but recognizes she should probably start taking the medication again, has been feeling a little more obsessive about things, looking at social media, digging, looking at photos of ex- and girlfriend, feels like she is torturing herself a bit with this. Has some low days/moments, but is working through some situational stressors. Feels like she is coping okay, has good coping skills, good supports through family and  friends. Moved into a new house, feels good there, no triggers. Didn't have her kids on Easter, felt sad and cried, then worked out and felt better. Work life is a little crazy. Currently in therapy once weekly.     How are you doing with your anxiety since your last visit? Improved    Are you having other symptoms that might be associated with anxiety? No    Have you had a significant life event? Relationship Concerns     Are you feeling depressed? No    Do you have any concerns with your use of alcohol or other drugs? No    PHQ 11/9/2020 2/1/2021 4/8/2021   PHQ-9 Total Score 10 8 4   Q9: Thoughts of better off dead/self-harm past 2 weeks Not at all Several days Not at all   F/U: Thoughts of suicide or self-harm - Yes -   F/U: Self harm-plan - No -   F/U: Self-harm action - No -   F/U: Safety concerns - No -     PRATIBHA-7 SCORE 2/1/2021 4/8/2021   Total Score 4 (minimal anxiety) -   Total Score 4 5     Social History     Tobacco Use     Smoking status: Current Every Day Smoker     Packs/day: 0.25     Years: 13.00     Pack years: 3.25     Types: Cigarettes     Smokeless tobacco: Never Used   Substance Use Topics     Alcohol use: Yes     Comment: 3 drinks/week     Drug use: No       Past Medical History:   Diagnosis Date     PRATIBHA (generalized anxiety disorder)      Severe episode of recurrent major depressive disorder, without psychotic features (H)      Current Outpatient Medications   Medication     escitalopram (LEXAPRO) 20 MG tablet     levonorgestrel (MIRENA) 20 MCG/24HR IUD     No current facility-administered medications for this visit.       No Known Allergies      Review of Systems   Gastrointestinal: Negative for abdominal pain, diarrhea and nausea.   Psychiatric/Behavioral: Positive for mood changes and sleep disturbance. The patient is nervous/anxious.             Objective       Vitals:  No vitals were obtained today due to virtual visit.    Physical Exam   GENERAL: Healthy, alert and no distress  EYES: Eyes  grossly normal to inspection.  No discharge or erythema, or obvious scleral/conjunctival abnormalities.  RESP: No audible wheeze, cough, or visible cyanosis.  No visible retractions or increased work of breathing.    SKIN: Visible skin clear. No significant rash, abnormal pigmentation or lesions.  NEURO: Cranial nerves grossly intact.  Mentation and speech appropriate for age.  PSYCH: Mentation appears normal, affect normal/bright, judgement and insight intact, normal speech and appearance well-groomed.        Video-Visit Details    Type of service:  Video Visit    Video End Time:8:20 AM    Originating Location (pt. Location): Home    Distant Location (provider location):  [facility]     Platform used for Video Visit: [employer]

## 2021-04-08 ENCOUNTER — VIRTUAL VISIT (OUTPATIENT)
Dept: PEDIATRICS | Facility: CLINIC | Age: 41
End: 2021-04-08
Payer: COMMERCIAL

## 2021-04-08 DIAGNOSIS — F41.9 ANXIETY: Primary | ICD-10-CM

## 2021-04-08 PROCEDURE — 99213 OFFICE O/P EST LOW 20 MIN: CPT | Mod: GT | Performed by: NURSE PRACTITIONER

## 2021-04-08 ASSESSMENT — ENCOUNTER SYMPTOMS
SLEEP DISTURBANCE: 1
NAUSEA: 0
DIARRHEA: 0
ABDOMINAL PAIN: 0
NERVOUS/ANXIOUS: 1

## 2021-04-08 ASSESSMENT — ANXIETY QUESTIONNAIRES
GAD7 TOTAL SCORE: 5
7. FEELING AFRAID AS IF SOMETHING AWFUL MIGHT HAPPEN: SEVERAL DAYS
6. BECOMING EASILY ANNOYED OR IRRITABLE: SEVERAL DAYS
IF YOU CHECKED OFF ANY PROBLEMS ON THIS QUESTIONNAIRE, HOW DIFFICULT HAVE THESE PROBLEMS MADE IT FOR YOU TO DO YOUR WORK, TAKE CARE OF THINGS AT HOME, OR GET ALONG WITH OTHER PEOPLE: SOMEWHAT DIFFICULT
2. NOT BEING ABLE TO STOP OR CONTROL WORRYING: SEVERAL DAYS
5. BEING SO RESTLESS THAT IT IS HARD TO SIT STILL: NOT AT ALL
1. FEELING NERVOUS, ANXIOUS, OR ON EDGE: SEVERAL DAYS
3. WORRYING TOO MUCH ABOUT DIFFERENT THINGS: SEVERAL DAYS

## 2021-04-08 ASSESSMENT — PATIENT HEALTH QUESTIONNAIRE - PHQ9
SUM OF ALL RESPONSES TO PHQ QUESTIONS 1-9: 4
5. POOR APPETITE OR OVEREATING: NOT AT ALL

## 2021-04-08 NOTE — PATIENT INSTRUCTIONS
"Thank you for taking the time to chat with me this morning, Elvira!     All things considered, I think you are doing really well!    Goal:  - Restart lexapro. Okay to start at 10 mg daily for a week, then increase to 20 mg daily. Dens [employer] message when due for refills.   - Try a 2 week break from social media! (Or whatever amount of time feels feasible to you).  - Watch caffeine intake  - Continue regular exercise  - Continue therapy  - Follow-up as needed (please don't hesitate!)    Patient Education   Tips for Sleep Hygiene  \"Sleep hygiene\" means having good sleep habits.Follow these tips to sleep better at night:     Get on a schedule. Go to bed and get up at about the same time every day.    Listen to your body. Only try to sleep when you actually feel tired or sleepy.    Be patient. If you haven't been able to get to sleep after about 30 minutes or more, get up and do something calming or boring until you feel sleepy. Then return to bed and try again.    Don't have caffeine (coffee, tea, cola drinks, chocolate and some medicines), alcohol or nicotine (cigarettes). These can make it harder for you to fall asleep and stay asleep.    Use your bed for sleeping only. That means no TV, computer or homework in bed, especially during the evening and before bedtime.    Don't nap during the day. If you must nap, make sure it is for less than 20 minutes.    Create sleep rituals that remind your body it is time to sleep. Examples include breathing exercises, stretching or reading a book.    Avoid all electronic media (smart phone, computer, tablet) within 2 hours of bed time. The \"blue light\" in these devices activates the part of the brain that keeps you awake.    Dim the lights at night.    Get early morning sources of light (walk in the sunshine) to help set sleep patterns at night.    Try a bath or shower before bed. Having a warm bath 1 to 2 hours before bedtime can help you feel sleepy. Hot baths can make you " "alert, so be mindful of the temperature.    Don't watch the clock. Checking the clock during the night can wake you up. It can also lead to negative thoughts such as, \"I will never fall asleep,\" which can increase anxiety and sleeplessness.    Use a sleep diary. Track your sleep schedule to know your sleep patterns and to see where you can improve.    Get regular exercise every day. Try not to do heavy exercise in the 4 hours before bedtime.    Eat a healthy, balanced diet.    Try eating a light, healthy snack before bed, but avoid eating a heavy meal.    Create the right sleeping area. A cool, dark, quiet room is best. If needed, try earplugs, fans and blackout curtains.    Keep your daytime routine the same even if you have a bad night sleep. Avoiding activities the next day can make it harder to sleep.  For informational purposes only. Not to replace the advice of your health care provider.   Copyright   2013 [facility]. All rights reserved. [place] [id number] - 01/16.       "

## 2021-04-09 ASSESSMENT — ANXIETY QUESTIONNAIRES: GAD7 TOTAL SCORE: 5

## 2021-10-09 ENCOUNTER — HEALTH MAINTENANCE LETTER (OUTPATIENT)
Age: 41
End: 2021-10-09

## 2022-02-11 ENCOUNTER — OFFICE VISIT (OUTPATIENT)
Dept: PEDIATRICS | Facility: CLINIC | Age: 42
End: 2022-02-11
Payer: COMMERCIAL

## 2022-02-11 VITALS
RESPIRATION RATE: 13 BRPM | DIASTOLIC BLOOD PRESSURE: 64 MMHG | HEIGHT: 66 IN | HEART RATE: 62 BPM | WEIGHT: 174 LBS | SYSTOLIC BLOOD PRESSURE: 118 MMHG | TEMPERATURE: 97.2 F | OXYGEN SATURATION: 98 % | BODY MASS INDEX: 27.97 KG/M2

## 2022-02-11 DIAGNOSIS — G89.29 CHRONIC PAIN OF BOTH KNEES: ICD-10-CM

## 2022-02-11 DIAGNOSIS — E66.3 OVERWEIGHT (BMI 25.0-29.9): ICD-10-CM

## 2022-02-11 DIAGNOSIS — Z11.3 ROUTINE SCREENING FOR STI (SEXUALLY TRANSMITTED INFECTION): ICD-10-CM

## 2022-02-11 DIAGNOSIS — Z12.31 ENCOUNTER FOR SCREENING MAMMOGRAM FOR BREAST CANCER: ICD-10-CM

## 2022-02-11 DIAGNOSIS — M25.561 CHRONIC PAIN OF BOTH KNEES: ICD-10-CM

## 2022-02-11 DIAGNOSIS — M25.562 CHRONIC PAIN OF BOTH KNEES: ICD-10-CM

## 2022-02-11 DIAGNOSIS — Z00.00 ROUTINE GENERAL MEDICAL EXAMINATION AT A HEALTH CARE FACILITY: Primary | ICD-10-CM

## 2022-02-11 LAB
CHOLEST SERPL-MCNC: 174 MG/DL
FASTING STATUS PATIENT QL REPORTED: YES
FASTING STATUS PATIENT QL REPORTED: YES
GLUCOSE BLD-MCNC: 92 MG/DL (ref 70–99)
HCV AB SERPL QL IA: NONREACTIVE
HDLC SERPL-MCNC: 74 MG/DL
HIV 1+2 AB+HIV1 P24 AG SERPL QL IA: NONREACTIVE
LDLC SERPL CALC-MCNC: 87 MG/DL
NONHDLC SERPL-MCNC: 100 MG/DL
T PALLIDUM AB SER QL: NONREACTIVE
TRIGL SERPL-MCNC: 67 MG/DL

## 2022-02-11 PROCEDURE — 87491 CHLMYD TRACH DNA AMP PROBE: CPT | Performed by: NURSE PRACTITIONER

## 2022-02-11 PROCEDURE — 80061 LIPID PANEL: CPT | Performed by: NURSE PRACTITIONER

## 2022-02-11 PROCEDURE — 86803 HEPATITIS C AB TEST: CPT | Performed by: NURSE PRACTITIONER

## 2022-02-11 PROCEDURE — 36415 COLL VENOUS BLD VENIPUNCTURE: CPT | Performed by: NURSE PRACTITIONER

## 2022-02-11 PROCEDURE — 87389 HIV-1 AG W/HIV-1&-2 AB AG IA: CPT | Performed by: NURSE PRACTITIONER

## 2022-02-11 PROCEDURE — 99396 PREV VISIT EST AGE 40-64: CPT | Performed by: NURSE PRACTITIONER

## 2022-02-11 PROCEDURE — 86780 TREPONEMA PALLIDUM: CPT | Performed by: NURSE PRACTITIONER

## 2022-02-11 PROCEDURE — 82947 ASSAY GLUCOSE BLOOD QUANT: CPT | Performed by: NURSE PRACTITIONER

## 2022-02-11 PROCEDURE — 87591 N.GONORRHOEAE DNA AMP PROB: CPT | Performed by: NURSE PRACTITIONER

## 2022-02-11 SDOH — ECONOMIC STABILITY: FOOD INSECURITY: WITHIN THE PAST 12 MONTHS, THE FOOD YOU BOUGHT JUST DIDN'T LAST AND YOU DIDN'T HAVE MONEY TO GET MORE.: NEVER TRUE

## 2022-02-11 SDOH — ECONOMIC STABILITY: INCOME INSECURITY: IN THE LAST 12 MONTHS, WAS THERE A TIME WHEN YOU WERE NOT ABLE TO PAY THE MORTGAGE OR RENT ON TIME?: NO

## 2022-02-11 SDOH — ECONOMIC STABILITY: TRANSPORTATION INSECURITY
IN THE PAST 12 MONTHS, HAS LACK OF TRANSPORTATION KEPT YOU FROM MEETINGS, WORK, OR FROM GETTING THINGS NEEDED FOR DAILY LIVING?: NO

## 2022-02-11 SDOH — HEALTH STABILITY: PHYSICAL HEALTH: ON AVERAGE, HOW MANY DAYS PER WEEK DO YOU ENGAGE IN MODERATE TO STRENUOUS EXERCISE (LIKE A BRISK WALK)?: 3 DAYS

## 2022-02-11 SDOH — ECONOMIC STABILITY: FOOD INSECURITY: WITHIN THE PAST 12 MONTHS, YOU WORRIED THAT YOUR FOOD WOULD RUN OUT BEFORE YOU GOT MONEY TO BUY MORE.: NEVER TRUE

## 2022-02-11 SDOH — ECONOMIC STABILITY: TRANSPORTATION INSECURITY
IN THE PAST 12 MONTHS, HAS THE LACK OF TRANSPORTATION KEPT YOU FROM MEDICAL APPOINTMENTS OR FROM GETTING MEDICATIONS?: NO

## 2022-02-11 SDOH — ECONOMIC STABILITY: INCOME INSECURITY: HOW HARD IS IT FOR YOU TO PAY FOR THE VERY BASICS LIKE FOOD, HOUSING, MEDICAL CARE, AND HEATING?: NOT HARD AT ALL

## 2022-02-11 SDOH — HEALTH STABILITY: PHYSICAL HEALTH: ON AVERAGE, HOW MANY MINUTES DO YOU ENGAGE IN EXERCISE AT THIS LEVEL?: 30 MIN

## 2022-02-11 ASSESSMENT — LIFESTYLE VARIABLES
HOW OFTEN DO YOU HAVE SIX OR MORE DRINKS ON ONE OCCASION: LESS THAN MONTHLY
HOW OFTEN DO YOU HAVE A DRINK CONTAINING ALCOHOL: 2-3 TIMES A WEEK
HOW MANY STANDARD DRINKS CONTAINING ALCOHOL DO YOU HAVE ON A TYPICAL DAY: 1 OR 2

## 2022-02-11 ASSESSMENT — ENCOUNTER SYMPTOMS
WEAKNESS: 0
HEARTBURN: 0
HEADACHES: 0
COUGH: 0
PARESTHESIAS: 0
DIZZINESS: 0
FREQUENCY: 0
DIARRHEA: 0
NERVOUS/ANXIOUS: 0
FEVER: 0
CHILLS: 0
ARTHRALGIAS: 0
SORE THROAT: 0
JOINT SWELLING: 0
HEMATOCHEZIA: 0
MYALGIAS: 0
ABDOMINAL PAIN: 0
CONSTIPATION: 0
PALPITATIONS: 0
EYE PAIN: 0
NAUSEA: 0
DYSURIA: 0
HEMATURIA: 0
SHORTNESS OF BREATH: 0
BREAST MASS: 0

## 2022-02-11 ASSESSMENT — SOCIAL DETERMINANTS OF HEALTH (SDOH)
IN A TYPICAL WEEK, HOW MANY TIMES DO YOU TALK ON THE PHONE WITH FAMILY, FRIENDS, OR NEIGHBORS?: MORE THAN THREE TIMES A WEEK
HOW OFTEN DO YOU GET TOGETHER WITH FRIENDS OR RELATIVES?: ONCE A WEEK
DO YOU BELONG TO ANY CLUBS OR ORGANIZATIONS SUCH AS CHURCH GROUPS UNIONS, FRATERNAL OR ATHLETIC GROUPS, OR SCHOOL GROUPS?: NO
HOW OFTEN DO YOU ATTEND CHURCH OR RELIGIOUS SERVICES?: NEVER

## 2022-02-11 ASSESSMENT — MIFFLIN-ST. JEOR: SCORE: 1466.01

## 2022-02-11 NOTE — PROGRESS NOTES
SUBJECTIVE:   CC: Elvira Ugalde is an 42 year old woman who presents for preventive health visit.     Patient has been advised of split billing requirements and indicates understanding: Yes     Healthy Habits:     Getting at least 3 servings of Calcium per day:  Yes    Bi-annual eye exam:  Yes    Dental care twice a year:  Yes    Sleep apnea or symptoms of sleep apnea:  None    Diet:  Regular (no restrictions)    Frequency of exercise:  2-3 days/week    Duration of exercise:  30-45 minutes    Taking medications regularly:  Not Applicable    Medication side effects:  None    PHQ-2 Total Score: 0    Additional concerns today:  Yes      Chronic bilateral knee pain. Worse since working out regularly, more bothersome.  Was a big runner in her late teens early 20s, 50 miles/week. Was also a catcher.  Has to do modifications when she exercises. Jumping and going down stairs is painful.   Can no longer run.       Today's PHQ-2 Score:   PHQ-2 ( 1999 [employer]) 2/11/2022   Q1: Little interest or pleasure in doing things 0   Q2: Feeling down, depressed or hopeless 0   PHQ-2 Score 0   PHQ-2 Total Score (12-17 Years)- Positive if 3 or more points; Administer PHQ-A if positive -   Q1: Little interest or pleasure in doing things Not at all   Q2: Feeling down, depressed or hopeless Not at all   PHQ-2 Score 0     Abuse: Current or Past (Physical, Sexual or Emotional) - No  Do you feel safe in your environment? Yes    Have you ever done Advance Care Planning? (For example, a Health Directive, POLST, or a discussion with a medical provider or your loved ones about your wishes): No, advance care planning information given to patient to review.  Patient declined advance care planning discussion at this time.    Social History     Tobacco Use     Smoking status: Current Every Day Smoker     Packs/day: 0.25     Years: 13.00     Pack years: 3.25     Types: Cigarettes     Smokeless tobacco: Never Used   Substance Use Topics     Alcohol  use: Yes     Comment: 3 drinks/week     Alcohol Use 2/11/2022   Prescreen: >3 drinks/day or >7 drinks/week? No     Reviewed orders with patient.  Reviewed health maintenance and updated orders accordingly - Yes  BP Readings from Last 3 Encounters:   02/11/22 118/64   02/22/21 116/68   02/01/21 118/64    Wt Readings from Last 3 Encounters:   02/11/22 78.9 kg (174 lb)   02/22/21 82.6 kg (182 lb 3.2 oz)   02/01/21 85.7 kg (188 lb 14.4 oz)            Breast Cancer Screening:  Any new diagnosis of family breast, ovarian, or bowel cancer? No    FHS-7: No flowsheet data found.    Mammogram Screening - Offered annual screening and updated Health Maintenance for mutual plan based on risk factor consideration    Pertinent mammograms are reviewed under the imaging tab.    History of abnormal Pap smear: NO - age 30-65 PAP every 5 years with negative HPV co-testing recommended  PAP / HPV Latest Ref Rng & Units 2/1/2021   PAP (Historical) - NIL   HPV16 NEG:Negative Negative   HPV18 NEG:Negative Negative   HRHPV NEG:Negative Negative     Reviewed and updated as needed this visit by clinical staff  Tobacco  Allergies  Meds           Reviewed and updated as needed this visit by Provider               Patient Active Problem List   Diagnosis     Dizziness     Past Medical History:   Diagnosis Date     PRATIBHA (generalized anxiety disorder)      Severe episode of recurrent major depressive disorder, without psychotic features (H)      Past Surgical History:   Procedure Laterality Date     NO HISTORY OF SURGERY       Family History   Problem Relation Age of Onset     Family History Negative Mother      Prostate Cancer Father 70        In remission     Family History Negative Maternal Grandmother      Family History Negative Maternal Grandfather      Family History Negative Paternal Grandmother      Family History Negative Paternal Grandfather      Family History Negative Brother      Family History Negative Sister      Social History      Socioeconomic History     Marital status:      Spouse name: Not on file     Number of children: Not on file     Years of education: Not on file     Highest education level: Not on file   Occupational History     Not on file   Tobacco Use     Smoking status: Current Every Day Smoker     Packs/day: 0.25     Years: 13.00     Pack years: 3.25     Types: Cigarettes     Smokeless tobacco: Never Used   Substance and Sexual Activity     Alcohol use: Yes     Comment: 3 drinks/week     Drug use: No     Sexual activity: Yes     Partners: Male     Birth control/protection: I.U.D.     Comment: Mirena placed 7 years ago   Other Topics Concern     Parent/sibling w/ CABG, MI or angioplasty before 65F 55M? 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      last year - relationship with her ex is recently much better.    4 children - 3 of which are early adults.     Started dating someone 3 months ago.             Martina Mayer, DNP, APRN, CNP    02/11/22         Social Determinants of Health     Financial Resource Strain: Low Risk      Difficulty of Paying Living Expenses: Not hard at all   Food Insecurity: No Food Insecurity     Worried About Running Out of Food in the Last Year: Never true     Ran Out of Food in the Last Year: Never true   Transportation Needs: No Transportation Needs     Lack of Transportation (Medical): No     Lack of Transportation (Non-Medical): No   Physical Activity: Insufficiently Active     Days of Exercise per Week: 3 days     Minutes of Exercise per Session: 30 min   Stress: No Stress Concern Present     Feeling of Stress : Only a little   Social Connections: Socially Isolated     Frequency of Communication with Friends and Family: More than three times a week     Frequency of Social Gatherings with Friends and Family: Once a week     Attends Adventism Services: Never     Active Member of Clubs or Organizations: No     Attends Club or  "Organization Meetings: Not on file     Marital Status:    Intimate Partner Violence: Not on file   Housing Stability: Low Risk      Unable to Pay for Housing in the Last Year: No     Number of Places Lived in the Last Year: 2     Unstable Housing in the Last Year: No     Current Outpatient Medications   Medication     escitalopram (LEXAPRO) 20 MG tablet     levonorgestrel (MIRENA) 20 MCG/24HR IUD     No current facility-administered medications for this visit.      No Known Allergies      Review of Systems   Constitutional: Negative for chills and fever.   HENT: Negative for congestion, ear pain, hearing loss and sore throat.    Eyes: Negative for pain and visual disturbance.   Respiratory: Negative for cough and shortness of breath.    Cardiovascular: Negative for chest pain, palpitations and peripheral edema.   Gastrointestinal: Negative for abdominal pain, constipation, diarrhea, heartburn, hematochezia and nausea.   Breasts:  Negative for tenderness, breast mass and discharge.   Genitourinary: Negative for dysuria, frequency, genital sores, hematuria, pelvic pain, urgency, vaginal bleeding and vaginal discharge.   Musculoskeletal: Negative for arthralgias, joint swelling and myalgias.   Skin: Negative for rash.   Neurological: Negative for dizziness, weakness, headaches and paresthesias.   Psychiatric/Behavioral: Negative for mood changes. The patient is not nervous/anxious.           OBJECTIVE:   /64 (BP Location: Right arm, Patient Position: Sitting, Cuff Size: Adult Regular)   Pulse 62   Temp 97.2  F (36.2  C) (Tympanic)   Resp 13   Ht 1.676 m (5' 6\")   Wt 78.9 kg (174 lb)   SpO2 98%   BMI 28.08 kg/m    Physical Exam  Constitutional: appears to be in no acute distress, comfortable, pleasant.   Eyes: anicteric, conjunctiva clear without drainage or erythema. SHAMEKA.   Ears, Nose and Throat: tympanic membranes gray with LR,  nose without nasal discharge. OP: no erythema to posterior pharynx, " negative post nasal drainage, tonsils +1 no erythema or exudate.  Neck: supple, thyroid palpable,not enlarged, no nodules   Breast: Exam deferred (deferred after discussion of exam options with patient, no symptoms or concerns).   Cardiovascular: regular rate and rhythm, normal S1 and S2, no murmurs, rubs or gallops, peripheral pulses full and symmetric; negative peripheral edema   Respiratory: Air entry throughout. Breathing pattern unlabored without the use of accessory muscles. Clear to auscultation A and P, no wheezes or crackles, normal breath sounds.    Gastrointestinal: rounded, soft. Positive bowel sounds x4, nontender, no masses.   Genitourinary: Exam deferred (deferred after discussion of exam options with patient, no symptoms or concerns, pap up to date).   Musculoskeletal: full range of motion, no edema.   Skin: pink, turgor smooth and elastic. Negative for lesions or dryness.  Neurological: normal gait, no tremor.   Psychological: appropriate mood and affect.   Lymphatic: no cervical, axillary, supraclavicular, or infraclavicular lymphadenopathy.      Diagnostic Test Results:  Labs reviewed in Epic    ASSESSMENT/PLAN:   (Z00.00) Routine general medical examination at a health care facility  (primary encounter diagnosis)  Age appropriate screening and preventative care have been addressed today. Vaccinations have been reviewed and are up to date. Patient has been advised to undertake routine aerobic activity. Counseled on recommended daily intake of calcium and vitamin D. Recommend annual vision exams as well as biannual dental exams. They will follow up for annual physical again in one year.   - Lipid panel reflex to direct LDL Fasting  - Glucose            (M25.561,  M25.562,  G89.29) Chronic pain of both knees  Chronic. Feel comfortable continuing to manage with activity modification for now. She can use topical voltaren gel or ibuprofen as needed. Return to clinic if symptoms worsening, will get  "x-rays and likely refer to ortho.       (E66.3) Overweight (BMI 25.0-29.9)  Body mass index is 28.08 kg/m . Continue regular exercise.   - Lipid panel reflex to direct LDL Fasting   - Glucose            (Z11.3) Routine screening for STI (sexually transmitted infection)  Would like routine screening for STI. Denies s/sx of STI. No known exposures.   - NEISSERIA GONORRHOEA PCR  - CHLAMYDIA TRACHOMATIS PCR  - Treponema Abs w Reflex to RPR and Titer  - Hepatitis C Screen Reflex to HCV RNA Quant and Genotype  - HIV Antigen Antibody Combo            (Z12.31) Encounter for screening mammogram for breast cancer  - MA Screen Bilateral w/Basim            Follow-up: Lab results pending, will follow-up as indicated after reviewing results.         COUNSELING:  Reviewed preventive health counseling, as reflected in patient instructions  Special attention given to:        Regular exercise       Healthy diet/nutrition       Vision screening       Immunizations       Osteoporosis prevention/bone health       Safe sex practices/STD prevention       Consider Hep C screening for all patients one time for ages 18-79 years       HIV screeninx in teen years, 1x in adult years, and at intervals if high risk    Estimated body mass index is 28.08 kg/m  as calculated from the following:    Height as of this encounter: 1.676 m (5' 6\").    Weight as of this encounter: 78.9 kg (174 lb).    Weight management plan: Discussed healthy diet and exercise guidelines    She reports that she has been smoking cigarettes. She has a 3.25 pack-year smoking history. She has never used smokeless tobacco.         Counseling Resources:  ATP IV Guidelines  Pooled Cohorts Equation Calculator  Breast Cancer Risk Calculator  BRCA-Related Cancer Risk Assessment: FHS-7 Tool  FRAX Risk Assessment  ICSI Preventive Guidelines  Dietary Guidelines for Americans, 2010  USDA's MyPlate  ASA Prophylaxis  Lung CA Screening    ELDA Chavis CNP  [facility] " CLINIC [place]

## 2022-02-12 LAB
C TRACH DNA SPEC QL NAA+PROBE: NEGATIVE
N GONORRHOEA DNA SPEC QL NAA+PROBE: NEGATIVE

## 2022-09-17 ENCOUNTER — HEALTH MAINTENANCE LETTER (OUTPATIENT)
Age: 42
End: 2022-09-17

## 2023-05-06 ENCOUNTER — HEALTH MAINTENANCE LETTER (OUTPATIENT)
Age: 43
End: 2023-05-06

## 2024-02-24 ENCOUNTER — HEALTH MAINTENANCE LETTER (OUTPATIENT)
Age: 44
End: 2024-02-24

## 2024-07-13 ENCOUNTER — HEALTH MAINTENANCE LETTER (OUTPATIENT)
Age: 44
End: 2024-07-13

## 2024-11-19 ENCOUNTER — ANCILLARY PROCEDURE (OUTPATIENT)
Dept: MAMMOGRAPHY | Facility: CLINIC | Age: 44
End: 2024-11-19
Attending: NURSE PRACTITIONER
Payer: COMMERCIAL

## 2024-11-19 DIAGNOSIS — Z12.31 VISIT FOR SCREENING MAMMOGRAM: ICD-10-CM

## 2024-11-19 PROCEDURE — 77063 BREAST TOMOSYNTHESIS BI: CPT | Mod: TC | Performed by: RADIOLOGY

## 2024-11-19 PROCEDURE — 77067 SCR MAMMO BI INCL CAD: CPT | Mod: TC | Performed by: RADIOLOGY

## 2025-01-24 ENCOUNTER — ANCILLARY PROCEDURE (OUTPATIENT)
Dept: GENERAL RADIOLOGY | Facility: CLINIC | Age: 45
End: 2025-01-24
Attending: NURSE PRACTITIONER
Payer: COMMERCIAL

## 2025-01-24 DIAGNOSIS — G89.29 CHRONIC PAIN OF BOTH KNEES: ICD-10-CM

## 2025-01-24 DIAGNOSIS — M25.562 CHRONIC PAIN OF BOTH KNEES: ICD-10-CM

## 2025-01-24 DIAGNOSIS — M25.561 CHRONIC PAIN OF BOTH KNEES: ICD-10-CM

## 2025-01-24 PROBLEM — E66.811 CLASS 1 OBESITY WITHOUT SERIOUS COMORBIDITY WITH BODY MASS INDEX (BMI) OF 31.0 TO 31.9 IN ADULT, UNSPECIFIED OBESITY TYPE: Status: ACTIVE | Noted: 2025-01-24

## 2025-01-24 PROCEDURE — 73562 X-RAY EXAM OF KNEE 3: CPT | Mod: TC | Performed by: RADIOLOGY

## 2025-01-27 ENCOUNTER — PATIENT OUTREACH (OUTPATIENT)
Dept: CARE COORDINATION | Facility: CLINIC | Age: 45
End: 2025-01-27
Payer: COMMERCIAL

## 2025-01-29 ENCOUNTER — PATIENT OUTREACH (OUTPATIENT)
Dept: CARE COORDINATION | Facility: CLINIC | Age: 45
End: 2025-01-29
Payer: COMMERCIAL

## 2025-02-20 NOTE — PROGRESS NOTES
ASSESSMENT & PLAN    Elvira was seen today for pain and pain.    Diagnoses and all orders for this visit:    Bilateral primary osteoarthritis of knee  -     Physical Therapy  Referral; Future    Patellofemoral arthralgia of both knees  -     Physical Therapy  Referral; Future        45 year old female presents with chronic bilateral knee pain, primarily in the patellofemoral compartment.  She presents today to discuss treatment of osteoarthritis in her knees.  We discussed conservative treatment of knee osteoarthritis and will proceed with following plan:    Plan:  -Try over the counter medication treatment options for osteoarthritis of the knee including XS Tylenol 1000mg up to three times daily, topical Voltaren gel up to every 6 hours as needed  -Can trial equipment for knee offloading such as a knee brace or walking poles   -Consider injections into the knee such as cortisone, hyaluronic acid, or platelet-rich plasma  -Can try Tumeric (Curcumin) supplements 500-1000mg daily  -Start PT and work on staying active with low-impact activity, start home exercise program  -If above conservative measures fail, consider referral to  orthopedic surgery for evaluation/discussion of joint replacement      Dr. Monika Call, DO, CAQ  [facility]  Sports Medicine     -----  Chief Complaint   Patient presents with    Left Knee - Pain    Right Knee - Pain       SUBJECTIVE  Elviranurys Ugalde is a/an 45 year old female who is seen as a self referral for evaluation of bilateral knee arthritis. This started 5 year(s) ago, with gradual onset. Pain is located over the anterior knee. Symptoms are worsened by running and exercising, stairs. She has tried no treatment to date. Associated symptoms include: locking/catching.    The patient is seen alone    Prior injury/Surgical history of affected joint: no history to that joint  Social History/Occupation:     REVIEW OF  SYSTEMS:  Pertinent positives/negative: As stated above in HPI    OBJECTIVE:  BP (!) 141/85   Pulse 83   LMP 01/10/2025    General: Alert and in no distress  CV: Extremities appear well perfused   Resp: normal respiratory effort  MSK:  RIGHT KNEE  Inspection:    Normal alignment; no edema, erythema, or ecchymosis present  Palpation:    Tender about the medial patellar facet. Remainder of bony and ligamentous landmarks are nontender.    No effusion is present    Patellofemoral crepitus is Present  Range of Motion:     00 extension to 1200 flexion  Strength:    Extensor mechanism intact  Special Tests:    Positive: Patellar grind         RADIOLOGY:  Final results and radiologist's interpretation available in the [facility] health record.  Images below were personally reviewed and discussed with the patient in the office today.  My personal interpretation of the performed imaging: X-ray of bilateral knees from 1/24/2025 reveal patellofemoral degenerative changes with associated bony spurring, mild medial and lateral compartment osteoarthritis               Disclaimer: This note consists of text and symbols derived from dictation and/or voice recognition software. As a result, there may be errors in the script that have gone undetected. Please consider this when interpreting information found in this chart.

## 2025-02-27 ENCOUNTER — OFFICE VISIT (OUTPATIENT)
Dept: ORTHOPEDICS | Facility: CLINIC | Age: 45
End: 2025-02-27
Payer: COMMERCIAL

## 2025-02-27 VITALS — SYSTOLIC BLOOD PRESSURE: 141 MMHG | DIASTOLIC BLOOD PRESSURE: 85 MMHG | HEART RATE: 83 BPM

## 2025-02-27 DIAGNOSIS — M22.2X2 PATELLOFEMORAL ARTHRALGIA OF BOTH KNEES: ICD-10-CM

## 2025-02-27 DIAGNOSIS — M17.0 BILATERAL PRIMARY OSTEOARTHRITIS OF KNEE: Primary | ICD-10-CM

## 2025-02-27 DIAGNOSIS — M22.2X1 PATELLOFEMORAL ARTHRALGIA OF BOTH KNEES: ICD-10-CM

## 2025-02-27 NOTE — PATIENT INSTRUCTIONS
1. Bilateral primary osteoarthritis of knee    2. Patellofemoral arthralgia of both knees        Plan:  -Try over the counter medication treatment options for osteoarthritis of the knee including XS Tylenol 1000mg up to three times daily, topical Voltaren gel up to every 6 hours as needed  -Can trial equipment for knee offloading such as a knee brace or walking poles   -Consider injections into the knee such as cortisone, hyaluronic acid, or platelet-rich plasma  -Can try Tumeric (Curcumin) supplements 500-1000mg daily  -Start PT and work on staying active with low-impact activity, start home exercise program  -If above conservative measures fail, consider referral to  orthopedic surgery for evaluation/discussion of joint replacement      If you had imaging performed/ordered at your appointment today, you can expect to see your radiology results in [employer] [facility] within approximately 48 business hours.     If you have questions/concerns after your appointment, please send my team a [employer] message or call the clinic at [phone].     Dr. Monika Call, DO, [facility]  Sports Medicine and Orthopedics    Dr. Call's Clinic Locations and Contact Numbers:   [place] APPOINTMENTS: [phone]      [address] [facility] RADIOLOGY: [phone]   [place] [address] PHYSICAL THERAPY: [phone]    HAND THERAPY/OT: [phone]   [place] BILLING QUESTIONS: [phone] [address] FAX: [phone]   [place] [address]       Non-Operative treatment of Knee Osteoarthritis    To Decrease Stress  Try to get regular exercise and stay active, try lower impact activity such as elliptical, bicycle, swimming, or walking  Muscle Strengthening: Consider physical therapy and start home exercise program  Weight Control  Consider using walking poles/cane or a knee brace to offload knee    To Decrease Pain  Tylenol (Acetaminophen) as needed 2 tablets (1,000 mg) three times per day, not to exceed 3,000 mg per day  "  Trial topical Voltaren (Diclofenac) gel up to 4x daily to area of pain  Glucosamine chondroitin or Tumeric (Curcumin) supplements. (May try taking for 3 months and if helpful, continue)  Steroid injections (no sooner than every 3 months)  \"Gel\" (Viscosupplementation) injections (Synvisc, Euflexxa, etc. are brand names)  Platelet Rich Plasma (Not covered by insurance)    Free Online Resources for Knee Osteoarthritis  My Knee exercise: Online program provides education and a 6-month knee strengthening program: [email or web address]  My Joint Yoga: Online 12-week yoga program with videos for knee/hip osteoarthritis: [email or web address]   "

## 2025-02-27 NOTE — LETTER
2/27/2025      Elvira Ugalde  [address]  [facility] [address]      Dear Colleague,    Thank you for referring your patient, Elvira Ugalde, to the [facility] [place]. Please see a copy of my visit note below.    ASSESSMENT & PLAN    Elvira was seen today for pain and pain.    Diagnoses and all orders for this visit:    Bilateral primary osteoarthritis of knee  -     Physical Therapy  Referral; Future    Patellofemoral arthralgia of both knees  -     Physical Therapy  Referral; Future        45 year old female presents with chronic bilateral knee pain, primarily in the patellofemoral compartment.  She presents today to discuss treatment of osteoarthritis in her knees.  We discussed conservative treatment of knee osteoarthritis and will proceed with following plan:    Plan:  -Try over the counter medication treatment options for osteoarthritis of the knee including XS Tylenol 1000mg up to three times daily, topical Voltaren gel up to every 6 hours as needed  -Can trial equipment for knee offloading such as a knee brace or walking poles   -Consider injections into the knee such as cortisone, hyaluronic acid, or platelet-rich plasma  -Can try Tumeric (Curcumin) supplements 500-1000mg daily  -Start PT and work on staying active with low-impact activity, start home exercise program  -If above conservative measures fail, consider referral to  orthopedic surgery for evaluation/discussion of joint replacement      Dr. Monika Call, DO, CAQ  [facility]  Sports Medicine     -----  Chief Complaint   Patient presents with     Left Knee - Pain     Right Knee - Pain       SUBJECTIVE  Elvira Ugalde is a/an 45 year old female who is seen as a self referral for evaluation of bilateral knee arthritis. This started 5 year(s) ago, with gradual onset. Pain is located over the anterior knee. Symptoms are worsened by running and  exercising, stairs. She has tried no treatment to date. Associated symptoms include: locking/catching.    The patient is seen alone    Prior injury/Surgical history of affected joint: no history to that joint  Social History/Occupation:     REVIEW OF SYSTEMS:  Pertinent positives/negative: As stated above in HPI    OBJECTIVE:  BP (!) 141/85   Pulse 83   LMP 01/10/2025    General: Alert and in no distress  CV: Extremities appear well perfused   Resp: normal respiratory effort  MSK:  RIGHT KNEE  Inspection:    Normal alignment; no edema, erythema, or ecchymosis present  Palpation:    Tender about the medial patellar facet. Remainder of bony and ligamentous landmarks are nontender.    No effusion is present    Patellofemoral crepitus is Present  Range of Motion:     00 extension to 1200 flexion  Strength:    Extensor mechanism intact  Special Tests:    Positive: Patellar grind         RADIOLOGY:  Final results and radiologist's interpretation available in the [facility] health record.  Images below were personally reviewed and discussed with the patient in the office today.  My personal interpretation of the performed imaging: X-ray of bilateral knees from 1/24/2025 reveal patellofemoral degenerative changes with associated bony spurring, mild medial and lateral compartment osteoarthritis               Disclaimer: This note consists of text and symbols derived from dictation and/or voice recognition software. As a result, there may be errors in the script that have gone undetected. Please consider this when interpreting information found in this chart.        Again, thank you for allowing me to participate in the care of your patient.        Sincerely,        Monika Call, DO    Electronically signed

## 2025-03-25 ENCOUNTER — ALLIED HEALTH/NURSE VISIT (OUTPATIENT)
Dept: PEDIATRICS | Facility: CLINIC | Age: 45
End: 2025-03-25
Payer: COMMERCIAL

## 2025-03-25 VITALS — TEMPERATURE: 97.7 F

## 2025-03-25 DIAGNOSIS — Z23 ENCOUNTER FOR IMMUNIZATION: Primary | ICD-10-CM

## 2025-03-25 PROCEDURE — 90471 IMMUNIZATION ADMIN: CPT

## 2025-03-25 PROCEDURE — 90746 HEPB VACCINE 3 DOSE ADULT IM: CPT

## 2025-03-25 PROCEDURE — 99207 PR NO CHARGE NURSE ONLY: CPT

## 2025-03-25 NOTE — PROGRESS NOTES
Prior to immunization administration, verified patients identity using patient s name and date of birth. Please see Immunization Activity for additional information.     Is the patient's temperature normal (100.5 or less)? Yes     Patient MEETS CRITERIA. PROCEED with vaccine administration.      Patient instructed to remain in clinic for 15 minutes afterwards, and to report any adverse reactions.      Link to Ancillary Visit Immunization Standing Orders SmartSet     Screening performed by Florinda Bojorquez CMA on 3/25/2025 at 8:18 AM.

## 2025-07-19 ENCOUNTER — HEALTH MAINTENANCE LETTER (OUTPATIENT)
Age: 45
End: 2025-07-19